# Patient Record
Sex: FEMALE | Race: WHITE | NOT HISPANIC OR LATINO | Employment: FULL TIME | URBAN - NONMETROPOLITAN AREA
[De-identification: names, ages, dates, MRNs, and addresses within clinical notes are randomized per-mention and may not be internally consistent; named-entity substitution may affect disease eponyms.]

---

## 2019-11-19 ENCOUNTER — OFFICE VISIT (OUTPATIENT)
Dept: CARDIOLOGY | Facility: CLINIC | Age: 34
End: 2019-11-19

## 2019-11-19 VITALS
SYSTOLIC BLOOD PRESSURE: 116 MMHG | WEIGHT: 197 LBS | HEART RATE: 72 BPM | DIASTOLIC BLOOD PRESSURE: 80 MMHG | OXYGEN SATURATION: 98 % | BODY MASS INDEX: 29.18 KG/M2 | HEIGHT: 69 IN

## 2019-11-19 DIAGNOSIS — R00.2 PALPITATIONS: Primary | ICD-10-CM

## 2019-11-19 DIAGNOSIS — R07.2 PRECORDIAL PAIN: ICD-10-CM

## 2019-11-19 DIAGNOSIS — R53.83 FATIGUE, UNSPECIFIED TYPE: ICD-10-CM

## 2019-11-19 PROCEDURE — 99204 OFFICE O/P NEW MOD 45 MIN: CPT | Performed by: NURSE PRACTITIONER

## 2019-11-19 PROCEDURE — 93000 ELECTROCARDIOGRAM COMPLETE: CPT | Performed by: NURSE PRACTITIONER

## 2019-11-19 NOTE — PATIENT INSTRUCTIONS
"BMI for Adults    Body mass index (BMI) is a number that is calculated from a person's weight and height. BMI may help to estimate how much of a person's weight is composed of fat. BMI can help identify those who may be at higher risk for certain medical problems.  How is BMI used with adults?  BMI is used as a screening tool to identify possible weight problems. It is used to check whether a person is obese, overweight, healthy weight, or underweight.  How is BMI calculated?  BMI measures your weight and compares it to your height. This can be done either in English ([place] or metric measurements. Note that charts are available to help you find your BMI quickly and easily without having to do these calculations yourself.  To calculate your BMI in English ([place] measurements, your health care provider will:  1. Measure your weight in pounds (lb).  2. Multiply the number of pounds by 703.  ? For example, for a person who weighs 180 lb, multiply that number by 703, which equals 126,540.  3. Measure your height in inches (in). Then multiply that number by itself to get a measurement called \"inches squared.\"  ? For example, for a person who is 70 in tall, the \"inches squared\" measurement is 70 in x 70 in, which equals 4900 inches squared.  4. Divide the total from Step 2 (number of lb x 703) by the total from Step 3 (inches squared): 126,540 ÷ 4900 = 25.8. This is your BMI.  To calculate your BMI in metric measurements, your health care provider will:  1. Measure your weight in kilograms (kg).  2. Measure your height in meters (m). Then multiply that number by itself to get a measurement called \"meters squared.\"  ? For example, for a person who is 1.75 m tall, the \"meters squared\" measurement is 1.75 m x 1.75 m, which is equal to 3.1 meters squared.  3. Divide the number of kilograms (your weight) by the meters squared number. In this example: 70 ÷ 3.1 = 22.6. This is your BMI.  How is BMI interpreted?  To interpret your " results, your health care provider will use BMI charts to identify whether you are underweight, normal weight, overweight, or obese. The following guidelines will be used:  · Underweight: BMI less than 18.5.  · Normal weight: BMI between 18.5 and 24.9.  · Overweight: BMI between 25 and 29.9.  · Obese: BMI of 30 and above.  Please note:  · Weight includes both fat and muscle, so someone with a muscular build, such as an athlete, may have a BMI that is higher than 24.9. In cases like these, BMI is not an accurate measure of body fat.  · To determine if excess body fat is the cause of a BMI of 25 or higher, further assessments may need to be done by a health care provider.  · BMI is usually interpreted in the same way for men and women.  Why is BMI a useful tool?  BMI is useful in two ways:  · Identifying a weight problem that may be related to a medical condition, or that may increase the risk for medical problems.  · Promoting lifestyle and diet changes in order to reach a healthy weight.  Summary  · Body mass index (BMI) is a number that is calculated from a person's weight and height.  · BMI may help to estimate how much of a person's weight is composed of fat. BMI can help identify those who may be at higher risk for certain medical problems.  · BMI can be measured using English measurements or metric measurements.  · To interpret your results, your health care provider will use BMI charts to identify whether you are underweight, normal weight, overweight, or obese.  This information is not intended to replace advice given to you by your health care provider. Make sure you discuss any questions you have with your health care provider.    Acute Coronary Syndrome    Acute coronary syndrome (ACS) is a serious problem in which there is suddenly not enough blood and oxygen reaching the heart. ACS can result in chest pain or a heart attack.  This condition is a medical emergency. If you have any symptoms of this  condition, get help right away.  What are the causes?  This condition may be caused by:  · Buildup of fat and cholesterol inside of the arteries (atherosclerosis). This is the most common cause. The buildup (plaque) can cause blood vessels in the heart (coronary arteries) to become narrow or blocked, which reduces blood flow to the heart. Plaque can also break off and lead to a clot, which can block an artery and cause a heart attack or stroke.  · Sudden tightening of the muscles around the coronary arteries (coronary spasm).  · Tearing of a coronary artery (spontaneous coronary artery dissection).  · Very low blood pressure (hypotension).  · An abnormal heartbeat (arrhythmia).  · Other medical conditions that cause a decrease of oxygen to the heart, such as anemiaorrespiratory failure.  · Using cocaine or methamphetamine.  What increases the risk?  The following factors may make you more likely to develop this condition:  · Age. The risk for ACS increases as you get older.  · History of chest pain, heart attack, peripheral artery disease, or stroke.  · Having taken chemotherapy or immune-suppressing medicines.  · Being male.  · Family history of chest pain, heart disease, or stroke.  · Smoking.  · Not exercising enough.  · Being overweight.  · High cholesterol.  · High blood pressure (hypertension).  · Diabetes.  · Excessive alcohol use.  What are the signs or symptoms?  Common symptoms of this condition include:  · Chest pain. The pain may last a long time, or it may stop and come back (recur). It may feel like:  ? Crushing or squeezing.  ? Tightness, pressure, fullness, or heaviness.  · Arm, neck, jaw, or back pain.  · Heartburn or indigestion.  · Shortness of breath.  · Nausea.  · Sudden cold sweats.  · Light-headedness.  · Dizziness, or passing out.  · Tiredness (fatigue).  Sometimes there are no symptoms.  How is this diagnosed?  This condition may be diagnosed based on:  · Your medical history and  symptoms.  · An electrocardiogram (ECG). This imaging test measures the heart's electrical activity.  · Blood tests. Cardiac blood tests may need to be repeated at designated time intervals.  · Chest X-ray.  · A CT scan of the chest.  · A coronary angiogram. This is a procedure in which dye is injected into the bloodstream and then X-rays are taken to show if there is a blockage in a coronary artery.  · Exercise stress testing.  · Echocardiography. This is a test that uses sound waves to produce detailed images of the heart.  How is this treated?  The treatment is to restore blood flow to the heart as soon as possible. Treatment for this condition may include:  · Oxygen therapy.  · Medicines, such as:  ? Antiplatelet medicines and blood-thinning medicines, such as aspirin. These help prevent blood clots.  ? Medicine that dissolves any blood clots (fibrinolytic therapy).  ? Blood pressure medicines.  ? Nitroglycerin. This helps relieve chest pain and widens blood vessels to improve blood flow.  ? Pain medicine.  ? Cholesterol-lowering medicine.  · Surgery, such as:  ? Coronary angioplasty with stent placement. This involves placing a small piece of metal that looks like mesh or a spring into a narrow coronary artery. This widens the artery and keep it open.  ? Coronary artery bypass surgery. This involves taking a section of a blood vessel from a different part of your body, and placing it on the blocked coronary artery to allow blood to flow around (bypass) the blockage.  · Cardiac rehabilitation. This is a program that helps improve your health and well-being. It includes exercise training, education, and counseling to help you recover.  Follow these instructions at home:  Eating and drinking  · Eat a heart-healthy diet that includes whole grains, fruits and vegetables, lean proteins, and low-fat or nonfat dairy products.  · Limit how much salt (sodium) you eat as told by your health care provider. Follow  instructions from your health care provider about any other eating or drinking restrictions, such as limiting foods that are high in fat and processed sugars.  · Use healthy cooking methods such as roasting, grilling, broiling, baking, poaching, steaming, or stir-frying.  · Talk with a dietitian to learn about healthy cooking methods and how to eat less sodium.  Medicines  · Take over-the-counter and prescription medicines only as told by your health care provider.  · Do not take these medicines unless your health care provider approves:  ? Vitamin supplements that contain vitamin A or vitamin E.  ? Nonsteroidal anti-inflammatory drugs (NSAIDs), such as ibuprofen, naproxen, or celecoxib.  ? Hormone replacement therapy that contains estrogen.  If you are taking blood thinners:  · Talk with your health care provider before you take any medicines that contain aspirin or NSAIDs. These medicines increase your risk for dangerous bleeding.  · Take your medicine exactly as told, at the same time every day.  · Avoid activities that could cause injury or bruising, and follow instructions about how to prevent falls.  · Wear a medical alert bracelet, and carry a card that lists what medicines you take.  Activity  · Join a cardiac rehabilitation program. An exercise plan will be developed for you.  · Ask your health care provider:  ? What activities and exercises are safe for you.  ? If you should follow specific instructions about lifting, driving, or climbing stairs.  Lifestyle  · Do not use any products that contain nicotine or tobacco, such as cigarettes and e-cigarettes. If you need help quitting, ask your health care provider.  · If your health care provider says that alcohol is safe for you, limit your alcohol intake to no more than 1 drink a day. One drink equals 12 oz of beer, 5 oz of wine, or 1½ oz of hard liquor.  · Maintain a healthy weight. If you need to lose weight, work with your health care provider to do so  safely.  General instructions  · Tell all the health care providers who care for you about your heart condition, including your dentist. This may affect the medicines or treatment you receive.  · Manage any other health conditions you have, such as hypertension or diabetes. These conditions affect your heart.  · Learn ways to manage stress.  · Get screened for depression, and get mental health treatment if you need it. People with ACS are at higher risk for depression.  · Keep your vaccinations up to date. Get the flu shot (influenza vaccine) every year.  · If directed, monitor your blood pressure at home.  · Keep all follow-up visits as told by your health care provider. This is important.  Contact a health care provider if:  · You feel overwhelmed or sad.  · You have trouble doing your daily activities.  Get help right away if:  · You have pain in your chest, neck, arm, jaw, stomach, or back that recurs, and:  ? It lasts for more than a few minutes.  ? It is not relieved by taking the medicineyour health care provider prescribed.  · You have unexplained:  ? Heavy sweating.  ? Heartburn or indigestion.  ? Nausea or vomiting.  ? Shortness of breath.  ? Difficulty breathing.  ? Fatigue.  ? Nervousness or anxiety.  ? Weakness.  ? Diarrhea.  ? Dark stools or blood in your stool.  · You have sudden light-headedness or dizziness.  · Your blood pressure is higher than 180/120.  · You faint.  · You have thoughts about hurting yourself.  These symptoms may represent a serious problem that is an emergency. Do not wait to see if the symptoms will go away. Get medical help right away. Call your local emergency services (751 in the [place] Do not drive yourself to the hospital.   If you ever feel like you may hurt yourself or others, or have thoughts about taking your own life, get help right away. You can go to your nearest emergency department or call:  · Emergency services (431 in the [place]  · A suicide crisis helpline, such  as the National Suicide Prevention Lifeline at 1-486.227.9010. This is open 24 hours a day.  Summary  · Acute coronary syndrome (ACS) is when there is not enough blood and oxygen being supplied to the heart. ACS can result in chest pain or a heart attack.  · Acute coronary syndrome is a medical emergency. If you have any symptoms of this condition, get help right away.  · Treatment includes medicines and procedures to open the blocked arteries and restore blood flow.  This information is not intended to replace advice given to you by your health care provider. Make sure you discuss any questions you have with your health care provider.  Document Released: 12/18/2006 Document Revised: 08/28/2018 Document Reviewed: 08/28/2018  [employer] Interactive Patient Education © 2019 [employer]    Palpitations  Palpitations are feelings that your heartbeat is not normal. Your heartbeat may feel like it is:  · Uneven.  · Faster than normal.  · Fluttering.  · Skipping a beat.  This is usually not a serious problem. In some cases, you may need tests to rule out any serious problems.  Follow these instructions at home:  Pay attention to any changes in your condition. Take these actions to help manage your symptoms:  Eating and drinking  · Avoid:  ? Coffee, tea, soft drinks, and energy drinks.  ? Chocolate.  ? Alcohol.  ? Diet pills.  Lifestyle    · Try to lower your stress. These things can help you relax:  ? Yoga.  ? Deep breathing and meditation.  ? Exercise.  ? Using words and images to create positive thoughts (guided imagery).  ? Using your mind to control things in your body (biofeedback).  · Do not use drugs.  · Get plenty of rest and sleep. Keep a regular bed time.  General instructions    · Take over-the-counter and prescription medicines only as told by your doctor.  · Do not use any products that contain nicotine or tobacco, such as cigarettes and e-cigarettes. If you need help quitting, ask your doctor.  · Keep all  follow-up visits as told by your doctor. This is important. You may need more tests if palpitations do not go away or get worse.  Contact a doctor if:  · Your symptoms last more than 24 hours.  · Your symptoms occur more often.  Get help right away if you:  · Have chest pain.  · Feel short of breath.  · Have a very bad headache.  · Feel dizzy.  · Pass out (faint).  Summary  · Palpitations are feelings that your heartbeat is uneven or faster than normal. It may feel like your heart is fluttering or skipping a beat.  · Avoid food and drinks that may cause palpitations. These include caffeine, chocolate, and alcohol.  · Try to lower your stress. Do not smoke or use drugs.  · Get help right away if you faint or have chest pain, shortness of breath, a severe headache, or dizziness.  This information is not intended to replace advice given to you by your health care provider. Make sure you discuss any questions you have with your health care provider.  Document Released: 09/26/2009 Document Revised: 01/30/2019 Document Reviewed: 01/30/2019  [employer] Interactive Patient Education © 2019 [employer]  Document Released: 08/29/2005 Document Revised: 10/31/2018 Document Reviewed: 10/31/2018  [employer] Interactive Patient Education © 2019 [employer]

## 2019-11-19 NOTE — PROGRESS NOTES
Subjective     China Carbajal is a 34 y.o. female who presents to day for Rapid Heart Rate (started end of June, reports high rate of 163) and Palpitations.    CHIEF COMPLIANT  Chief Complaint   Patient presents with   • Rapid Heart Rate     started end of June, reports high rate of 163   • Palpitations     Problem List    Palpitations  Tachycardia    Active Problems:  Problem List Items Addressed This Visit     None      Visit Diagnoses     Palpitations    -  Primary    Relevant Orders    Cardiac Event Monitor    Adult Transthoracic Echo Complete W/ Cont if Necessary Per Protocol    Stress Test With Myocardial Perfusion One Day    Precordial pain        Relevant Orders    Adult Transthoracic Echo Complete W/ Cont if Necessary Per Protocol    Stress Test With Myocardial Perfusion One Day    Fatigue, unspecified type        Relevant Orders    Adult Transthoracic Echo Complete W/ Cont if Necessary Per Protocol    Stress Test With Myocardial Perfusion One Day          HPI  HPI  Ms. Carbajal is a 34-year-old female who complains of rapid heart rate, and palpitations.  She verbalizes that she has had this since June she also complains of chest pain and did not feel very well when she went away on its own.  In association with this she had fatigue and severe weakness.  Approximately 3 weeks ago she had a very similar situation where she just felt really bad and her heart rate stayed in the 1 10-1 20 range for the entire day.  Then approximately 2 weeks ago it felt like she was punched in the chest and had significant chest pain with a heart rate of 162 she stayed in a heart rate greater than 100 for the remainder of the day.  She states that her normal heart rates between 70 and 80.  And states that even when she exercise it usually does not get be of 120 bpm.  States that she is very active and goes to gym approximately 4 to 6 days a week.  States that when her heart rates fast she does have a lot less energy with a decreased  exercise tolerance and fatigue.  Patient did deny any shortness of air, PND, orthopnea, decreased exercise tolerance at baseline, syncope, or neurological changes.  PRIOR MEDS  Current Outpatient Medications on File Prior to Visit   Medication Sig Dispense Refill   • Ascorbic Acid (VITAMIN C ADULT GUMMIES PO) Take  by mouth Daily.     • COLLAGEN PO Take  by mouth Daily.     • magnesium oxide (MAGOX) 400 (241.3 Mg) MG tablet tablet Take 400 mg by mouth Daily.     • norethindrone-ethinyl estradiol-iron (ESTROSTEP FE) 1-20/1-30/1-35 MG-MCG tablet Take  by mouth Daily.       No current facility-administered medications on file prior to visit.        ALLERGIES  Bactrim [sulfamethoxazole-trimethoprim]; Procaine; and Latex    HISTORY  Past Medical History:   Diagnosis Date   • Asthma    • Fatty liver 2018       Social History     Socioeconomic History   • Marital status:      Spouse name: Not on file   • Number of children: Not on file   • Years of education: Not on file   • Highest education level: Not on file   Tobacco Use   • Smoking status: Never Smoker   • Smokeless tobacco: Never Used   Substance and Sexual Activity   • Alcohol use: No   • Drug use: No   • Sexual activity: Defer       Family History   Problem Relation Age of Onset   • Hypertension Mother    • Cancer Father    • Supraventricular tachycardia Father        Review of Systems   Constitutional: Positive for fatigue. Negative for diaphoresis.   HENT: Negative.    Eyes: Negative.    Respiratory: Negative.  Negative for chest tightness and shortness of breath.    Cardiovascular: Positive for chest pain (when heart rate is elevated), palpitations and leg swelling (feet swelling, plantar fasciatis).   Gastrointestinal: Positive for abdominal pain (sees GI), constipation, diarrhea and nausea. Negative for vomiting.   Endocrine: Negative.    Genitourinary: Negative.    Musculoskeletal: Negative.  Negative for back pain and neck pain.  "  Allergic/Immunologic: Negative for environmental allergies.   Neurological: Negative.  Negative for dizziness, syncope and light-headedness.   Hematological: Negative.  Does not bruise/bleed easily.   Psychiatric/Behavioral: Negative for sleep disturbance (denies waking with cp or soa).       Objective     VITALS: /80 (BP Location: Left arm, Patient Position: Sitting)   Pulse 72   Ht 175.3 cm (69\")   Wt 89.4 kg (197 lb)   SpO2 98%   BMI 29.09 kg/m²     LABS:   Lab Results (most recent)     None          IMAGING:   No Images in the past 120 days found..    EXAM:  Physical Exam   Constitutional: She is oriented to person, place, and time. Vital signs are normal. She appears well-developed and well-nourished.   HENT:   Head: Normocephalic.   Eyes: EOM are normal. Pupils are equal, round, and reactive to light.   Neck: Trachea normal and phonation normal. Neck supple. No JVD present. Carotid bruit is not present. No thyroid mass present.   Cardiovascular: Normal rate, regular rhythm, normal heart sounds and intact distal pulses. Exam reveals no gallop and no friction rub.   No murmur heard.  Pulses:       Radial pulses are 2+ on the right side, and 2+ on the left side.        Posterior tibial pulses are 2+ on the right side, and 2+ on the left side.   Pulmonary/Chest: Effort normal and breath sounds normal. No respiratory distress. She has no wheezes. She has no rales.   Abdominal: Soft. Bowel sounds are normal. She exhibits no distension and no abdominal bruit. There is no tenderness.   Musculoskeletal: Normal range of motion.   Neurological: She is alert and oriented to person, place, and time. She has normal strength. No cranial nerve deficit or sensory deficit.   Skin: Skin is warm, dry and intact. Capillary refill takes less than 2 seconds. No rash noted.   Psychiatric: She has a normal mood and affect. Her speech is normal and behavior is normal. Judgment and thought content normal. Cognition and " memory are normal.   Nursing note and vitals reviewed.      Procedure     ECG 12 Lead  Date/Time: 11/19/2019 10:40 AM  Performed by: Weston Hercules APRN  Authorized by: Weston Herclues APRN   Comparison: not compared with previous ECG   Previous ECG: no previous ECG available  Rhythm: sinus rhythm  Rate: normal  BPM: 65  QRS axis: normal    Clinical impression: normal ECG               Assessment/Plan    Diagnosis Plan   1. Palpitations  Cardiac Event Monitor    Adult Transthoracic Echo Complete W/ Cont if Necessary Per Protocol    Stress Test With Myocardial Perfusion One Day   2. Precordial pain  Adult Transthoracic Echo Complete W/ Cont if Necessary Per Protocol    Stress Test With Myocardial Perfusion One Day   3. Fatigue, unspecified type  Adult Transthoracic Echo Complete W/ Cont if Necessary Per Protocol    Stress Test With Myocardial Perfusion One Day   1.  Is having significant tachycardia and palpitations as reported.  Patient seen in normal sinus rhythm today at 65.  However patient states that her rates are getting up to 140 and at one time even got up greater than 160.  Will order an event monitor to further evaluate her tachycardia/palpitations.  Verbalized that she recently had lab work done at Dr. Osman Parra's and that he verbalized everything was normal.  We will try to get a copy of those labs prior to repeating the blood work.  2.  Is having chest pain and is unable to be determined whether she is to have the chest pain or palpitations first.  Due to atypical chest pain I think it reasonable to further evaluate for an ischemic cause.  We will perform echocardiogram and stress test.  3.  Informed of signs of symptoms ACS and advised to seek emergent treatment for any new or worsening symptoms.  Patient also advised to seek sooner follow-up as needed.    4.  Beta-blocker therapy with patient and patient wishes to hold off at this time.    Return in about 3 months (around 2/19/2020), or if  symptoms worsen or fail to improve.    [place] was seen today for rapid heart rate and palpitations.    Diagnoses and all orders for this visit:    Palpitations  -     Cardiac Event Monitor; Future  -     Adult Transthoracic Echo Complete W/ Cont if Necessary Per Protocol; Future  -     Stress Test With Myocardial Perfusion One Day; Future    Precordial pain  -     Adult Transthoracic Echo Complete W/ Cont if Necessary Per Protocol; Future  -     Stress Test With Myocardial Perfusion One Day; Future    Fatigue, unspecified type  -     Adult Transthoracic Echo Complete W/ Cont if Necessary Per Protocol; Future  -     Stress Test With Myocardial Perfusion One Day; Future    Other orders  -     ECG 12 Lead              Patient's Body mass index is 29.09 kg/m². BMI is above normal parameters. Recommendations include: educational material.           MEDS ORDERED DURING VISIT:  No orders of the defined types were placed in this encounter.          This document has been electronically signed by Weston Hercules Jr., OMKAR  November 20, 2019 1:42 AM

## 2019-12-12 ENCOUNTER — APPOINTMENT (OUTPATIENT)
Dept: CARDIOLOGY | Facility: HOSPITAL | Age: 34
End: 2019-12-12

## 2020-01-16 ENCOUNTER — HOSPITAL ENCOUNTER (OUTPATIENT)
Dept: CARDIOLOGY | Facility: HOSPITAL | Age: 35
Discharge: HOME | End: 2020-01-16

## 2020-01-16 DIAGNOSIS — R53.83 FATIGUE, UNSPECIFIED TYPE: ICD-10-CM

## 2020-01-16 DIAGNOSIS — R07.2 PRECORDIAL PAIN: ICD-10-CM

## 2020-01-16 DIAGNOSIS — R00.2 PALPITATIONS: ICD-10-CM

## 2020-01-16 PROCEDURE — 0 TECHNETIUM SESTAMIBI: Performed by: INTERNAL MEDICINE

## 2020-01-16 PROCEDURE — 93306 TTE W/DOPPLER COMPLETE: CPT | Performed by: INTERNAL MEDICINE

## 2020-01-16 PROCEDURE — A9500 TC99M SESTAMIBI: HCPCS | Performed by: INTERNAL MEDICINE

## 2020-01-16 PROCEDURE — 78452 HT MUSCLE IMAGE SPECT MULT: CPT

## 2020-01-16 PROCEDURE — 93017 CV STRESS TEST TRACING ONLY: CPT

## 2020-01-16 PROCEDURE — 78452 HT MUSCLE IMAGE SPECT MULT: CPT | Performed by: INTERNAL MEDICINE

## 2020-01-16 PROCEDURE — 93018 CV STRESS TEST I&R ONLY: CPT | Performed by: INTERNAL MEDICINE

## 2020-01-16 PROCEDURE — 93306 TTE W/DOPPLER COMPLETE: CPT

## 2020-01-16 PROCEDURE — 93016 CV STRESS TEST SUPVJ ONLY: CPT | Performed by: NURSE PRACTITIONER

## 2020-01-16 RX ADMIN — TECHNETIUM TC 99M SESTAMIBI 1 DOSE: 1 INJECTION INTRAVENOUS at 10:15

## 2020-01-16 RX ADMIN — TECHNETIUM TC 99M SESTAMIBI 1 DOSE: 1 INJECTION INTRAVENOUS at 08:43

## 2020-01-17 LAB
BH CV NUCLEAR PRIOR STUDY: 3
BH CV STRESS BP STAGE 1: NORMAL
BH CV STRESS BP STAGE 2: NORMAL
BH CV STRESS BP STAGE 3: NORMAL
BH CV STRESS DURATION MIN STAGE 1: 3
BH CV STRESS DURATION MIN STAGE 2: 3
BH CV STRESS DURATION MIN STAGE 3: 3
BH CV STRESS DURATION SEC STAGE 1: 0
BH CV STRESS DURATION SEC STAGE 2: 0
BH CV STRESS DURATION SEC STAGE 3: 0
BH CV STRESS GRADE STAGE 1: 10
BH CV STRESS GRADE STAGE 2: 12
BH CV STRESS GRADE STAGE 3: 14
BH CV STRESS HR STAGE 1: 118
BH CV STRESS HR STAGE 2: 140
BH CV STRESS HR STAGE 3: 164
BH CV STRESS METS STAGE 1: 5
BH CV STRESS METS STAGE 2: 7.5
BH CV STRESS METS STAGE 3: 10
BH CV STRESS PROTOCOL 1: NORMAL
BH CV STRESS RECOVERY BP: NORMAL MMHG
BH CV STRESS RECOVERY HR: 99 BPM
BH CV STRESS SPEED STAGE 1: 1.7
BH CV STRESS SPEED STAGE 2: 2.5
BH CV STRESS SPEED STAGE 3: 3.4
BH CV STRESS STAGE 1: 1
BH CV STRESS STAGE 2: 2
BH CV STRESS STAGE 3: 3
MAXIMAL PREDICTED HEART RATE: 185 BPM
PERCENT MAX PREDICTED HR: 88.65 %
STRESS BASELINE BP: NORMAL MMHG
STRESS BASELINE HR: 79 BPM
STRESS PERCENT HR: 104 %
STRESS POST ESTIMATED WORKLOAD: 10.1 METS
STRESS POST EXERCISE DUR MIN: 7 MIN
STRESS POST EXERCISE DUR SEC: 28 SEC
STRESS POST PEAK BP: NORMAL MMHG
STRESS POST PEAK HR: 164 BPM
STRESS TARGET HR: 157 BPM

## 2020-01-20 ENCOUNTER — DOCUMENTATION (OUTPATIENT)
Dept: CARDIOLOGY | Facility: CLINIC | Age: 35
End: 2020-01-20

## 2020-01-20 NOTE — PROGRESS NOTES
PATIENT HAD L/M NEEDING STRESS TEST RESULTS. AWARE NEG. FOR ISCHEMIA AND NEEDS TO KEEP F/U APPT. IN FEB. SINDI MONET

## 2020-01-21 ENCOUNTER — TELEPHONE (OUTPATIENT)
Dept: CARDIOLOGY | Facility: CLINIC | Age: 35
End: 2020-01-21

## 2020-01-21 NOTE — TELEPHONE ENCOUNTER
Patient notified of echo results per MARILEE ESPITIA, patient verbalized understanding. Doris Cardozo LPN        ----- Message from OMKAR Dunlap sent at 1/20/2020 10:25 PM EST -----  Regarding: FW:  Normal stress keep follow up  ----- Message -----  From: Kirk Park MD  Sent: 1/17/2020   4:44 PM EST  To: OMKAR Dunlap

## 2020-01-27 LAB
BH CV ECHO MEAS - ACS: 1.8 CM
BH CV ECHO MEAS - AO MAX PG: 11.3 MMHG
BH CV ECHO MEAS - AO MEAN PG: 5 MMHG
BH CV ECHO MEAS - AO ROOT AREA (BSA CORRECTED): 1.3
BH CV ECHO MEAS - AO ROOT AREA: 5.3 CM^2
BH CV ECHO MEAS - AO ROOT DIAM: 2.6 CM
BH CV ECHO MEAS - AO V2 MAX: 168 CM/SEC
BH CV ECHO MEAS - AO V2 MEAN: 105 CM/SEC
BH CV ECHO MEAS - AO V2 VTI: 33.6 CM
BH CV ECHO MEAS - BSA(HAYCOCK): 2.1 M^2
BH CV ECHO MEAS - BSA: 2.1 M^2
BH CV ECHO MEAS - BZI_BMI: 29.1 KILOGRAMS/M^2
BH CV ECHO MEAS - BZI_METRIC_HEIGHT: 175.3 CM
BH CV ECHO MEAS - BZI_METRIC_WEIGHT: 89.4 KG
BH CV ECHO MEAS - EDV(CUBED): 56.2 ML
BH CV ECHO MEAS - EDV(MOD-SP4): 83.9 ML
BH CV ECHO MEAS - EDV(TEICH): 63.1 ML
BH CV ECHO MEAS - EF(CUBED): 80.3 %
BH CV ECHO MEAS - EF(MOD-SP4): 56.6 %
BH CV ECHO MEAS - EF(TEICH): 73.4 %
BH CV ECHO MEAS - ESV(CUBED): 11.1 ML
BH CV ECHO MEAS - ESV(MOD-SP4): 36.4 ML
BH CV ECHO MEAS - ESV(TEICH): 16.8 ML
BH CV ECHO MEAS - FS: 41.8 %
BH CV ECHO MEAS - IVS/LVPW: 1.1
BH CV ECHO MEAS - IVSD: 1.1 CM
BH CV ECHO MEAS - LA DIMENSION: 3.8 CM
BH CV ECHO MEAS - LA/AO: 1.5
BH CV ECHO MEAS - LV DIASTOLIC VOL/BSA (35-75): 40.9 ML/M^2
BH CV ECHO MEAS - LV IVRT: 0.1 SEC
BH CV ECHO MEAS - LV MASS(C)D: 121.5 GRAMS
BH CV ECHO MEAS - LV MASS(C)DI: 59.2 GRAMS/M^2
BH CV ECHO MEAS - LV SYSTOLIC VOL/BSA (12-30): 17.7 ML/M^2
BH CV ECHO MEAS - LVIDD: 3.8 CM
BH CV ECHO MEAS - LVIDS: 2.2 CM
BH CV ECHO MEAS - LVLD AP4: 7.3 CM
BH CV ECHO MEAS - LVLS AP4: 6.4 CM
BH CV ECHO MEAS - LVOT AREA (M): 2.5 CM^2
BH CV ECHO MEAS - LVOT AREA: 2.5 CM^2
BH CV ECHO MEAS - LVOT DIAM: 1.8 CM
BH CV ECHO MEAS - LVPWD: 0.97 CM
BH CV ECHO MEAS - MV A MAX VEL: 68.9 CM/SEC
BH CV ECHO MEAS - MV DEC SLOPE: 436 CM/SEC^2
BH CV ECHO MEAS - MV E MAX VEL: 82.9 CM/SEC
BH CV ECHO MEAS - MV E/A: 1.2
BH CV ECHO MEAS - RVDD: 3.3 CM
BH CV ECHO MEAS - SI(AO): 86.9 ML/M^2
BH CV ECHO MEAS - SI(CUBED): 22 ML/M^2
BH CV ECHO MEAS - SI(MOD-SP4): 23.1 ML/M^2
BH CV ECHO MEAS - SI(TEICH): 22.6 ML/M^2
BH CV ECHO MEAS - SV(AO): 178.4 ML
BH CV ECHO MEAS - SV(CUBED): 45.1 ML
BH CV ECHO MEAS - SV(MOD-SP4): 47.5 ML
BH CV ECHO MEAS - SV(TEICH): 46.4 ML
MAXIMAL PREDICTED HEART RATE: 185 BPM
STRESS TARGET HR: 157 BPM

## 2020-02-20 ENCOUNTER — OFFICE VISIT (OUTPATIENT)
Dept: CARDIOLOGY | Facility: CLINIC | Age: 35
End: 2020-02-20

## 2020-02-20 VITALS
HEIGHT: 69 IN | BODY MASS INDEX: 30.36 KG/M2 | OXYGEN SATURATION: 99 % | HEART RATE: 88 BPM | DIASTOLIC BLOOD PRESSURE: 87 MMHG | WEIGHT: 205 LBS | SYSTOLIC BLOOD PRESSURE: 142 MMHG

## 2020-02-20 DIAGNOSIS — I10 ESSENTIAL HYPERTENSION: Primary | ICD-10-CM

## 2020-02-20 DIAGNOSIS — R60.0 BILATERAL LEG EDEMA: ICD-10-CM

## 2020-02-20 DIAGNOSIS — R00.2 PALPITATIONS: ICD-10-CM

## 2020-02-20 PROCEDURE — 99214 OFFICE O/P EST MOD 30 MIN: CPT | Performed by: NURSE PRACTITIONER

## 2020-02-20 RX ORDER — LOSARTAN POTASSIUM 25 MG/1
12.5 TABLET ORAL DAILY
Qty: 15 TABLET | Refills: 11 | Status: SHIPPED | OUTPATIENT
Start: 2020-02-20 | End: 2021-01-21

## 2020-02-20 NOTE — PATIENT INSTRUCTIONS
"BMI for Adults    Body mass index (BMI) is a number that is calculated from a person's weight and height. BMI may help to estimate how much of a person's weight is composed of fat. BMI can help identify those who may be at higher risk for certain medical problems.  How is BMI used with adults?  BMI is used as a screening tool to identify possible weight problems. It is used to check whether a person is obese, overweight, healthy weight, or underweight.  How is BMI calculated?  BMI measures your weight and compares it to your height. This can be done either in English ([place] or metric measurements. Note that charts are available to help you find your BMI quickly and easily without having to do these calculations yourself.  To calculate your BMI in English ([place] measurements, your health care provider will:  1. Measure your weight in pounds (lb).  2. Multiply the number of pounds by 703.  ? For example, for a person who weighs 180 lb, multiply that number by 703, which equals 126,540.  3. Measure your height in inches (in). Then multiply that number by itself to get a measurement called \"inches squared.\"  ? For example, for a person who is 70 in tall, the \"inches squared\" measurement is 70 in x 70 in, which equals 4900 inches squared.  4. Divide the total from Step 2 (number of lb x 703) by the total from Step 3 (inches squared): 126,540 ÷ 4900 = 25.8. This is your BMI.  To calculate your BMI in metric measurements, your health care provider will:  1. Measure your weight in kilograms (kg).  2. Measure your height in meters (m). Then multiply that number by itself to get a measurement called \"meters squared.\"  ? For example, for a person who is 1.75 m tall, the \"meters squared\" measurement is 1.75 m x 1.75 m, which is equal to 3.1 meters squared.  3. Divide the number of kilograms (your weight) by the meters squared number. In this example: 70 ÷ 3.1 = 22.6. This is your BMI.  How is BMI interpreted?  To interpret your " results, your health care provider will use BMI charts to identify whether you are underweight, normal weight, overweight, or obese. The following guidelines will be used:  · Underweight: BMI less than 18.5.  · Normal weight: BMI between 18.5 and 24.9.  · Overweight: BMI between 25 and 29.9.  · Obese: BMI of 30 and above.  Please note:  · Weight includes both fat and muscle, so someone with a muscular build, such as an athlete, may have a BMI that is higher than 24.9. In cases like these, BMI is not an accurate measure of body fat.  · To determine if excess body fat is the cause of a BMI of 25 or higher, further assessments may need to be done by a health care provider.  · BMI is usually interpreted in the same way for men and women.  Why is BMI a useful tool?  BMI is useful in two ways:  · Identifying a weight problem that may be related to a medical condition, or that may increase the risk for medical problems.  · Promoting lifestyle and diet changes in order to reach a healthy weight.  Summary  · Body mass index (BMI) is a number that is calculated from a person's weight and height.  · BMI may help to estimate how much of a person's weight is composed of fat. BMI can help identify those who may be at higher risk for certain medical problems.  · BMI can be measured using English measurements or metric measurements.  · To interpret your results, your health care provider will use BMI charts to identify whether you are underweight, normal weight, overweight, or obese.  This information is not intended to replace advice given to you by your health care provider. Make sure you discuss any questions you have with your health care provider.  Document Released: 08/29/2005 Document Revised: 10/31/2018 Document Reviewed: 10/31/2018  [employer] Interactive Patient Education © 2020 [employer]    Acute Coronary Syndrome    Acute coronary syndrome (ACS) is a serious problem in which there is suddenly not enough blood and  oxygen reaching the heart. ACS can result in chest pain or a heart attack.  This condition is a medical emergency. If you have any symptoms of this condition, get help right away.  What are the causes?  This condition may be caused by:  · Buildup of fat and cholesterol inside of the arteries (atherosclerosis). This is the most common cause. The buildup (plaque) can cause blood vessels in the heart (coronary arteries) to become narrow or blocked, which reduces blood flow to the heart. Plaque can also break off and lead to a clot, which can block an artery and cause a heart attack or stroke.  · Sudden tightening of the muscles around the coronary arteries (coronary spasm).  · Tearing of a coronary artery (spontaneous coronary artery dissection).  · Very low blood pressure (hypotension).  · An abnormal heartbeat (arrhythmia).  · Other medical conditions that cause a decrease of oxygen to the heart, such as anemiaorrespiratory failure.  · Using cocaine or methamphetamine.  What increases the risk?  The following factors may make you more likely to develop this condition:  · Age. The risk for ACS increases as you get older.  · History of chest pain, heart attack, peripheral artery disease, or stroke.  · Having taken chemotherapy or immune-suppressing medicines.  · Being male.  · Family history of chest pain, heart disease, or stroke.  · Smoking.  · Not exercising enough.  · Being overweight.  · High cholesterol.  · High blood pressure (hypertension).  · Diabetes.  · Excessive alcohol use.  What are the signs or symptoms?  Common symptoms of this condition include:  · Chest pain. The pain may last a long time, or it may stop and come back (recur). It may feel like:  ? Crushing or squeezing.  ? Tightness, pressure, fullness, or heaviness.  · Arm, neck, jaw, or back pain.  · Heartburn or indigestion.  · Shortness of breath.  · Nausea.  · Sudden cold sweats.  · Light-headedness.  · Dizziness, or passing out.  · Tiredness  (fatigue).  Sometimes there are no symptoms.  How is this diagnosed?  This condition may be diagnosed based on:  · Your medical history and symptoms.  · An electrocardiogram (ECG). This imaging test measures the heart's electrical activity.  · Blood tests. Cardiac blood tests may need to be repeated at designated time intervals.  · Chest X-ray.  · A CT scan of the chest.  · A coronary angiogram. This is a procedure in which dye is injected into the bloodstream and then X-rays are taken to show if there is a blockage in a coronary artery.  · Exercise stress testing.  · Echocardiography. This is a test that uses sound waves to produce detailed images of the heart.  How is this treated?  The treatment is to restore blood flow to the heart as soon as possible. Treatment for this condition may include:  · Oxygen therapy.  · Medicines, such as:  ? Antiplatelet medicines and blood-thinning medicines, such as aspirin. These help prevent blood clots.  ? Medicine that dissolves any blood clots (fibrinolytic therapy).  ? Blood pressure medicines.  ? Nitroglycerin. This helps relieve chest pain and widens blood vessels to improve blood flow.  ? Pain medicine.  ? Cholesterol-lowering medicine.  · Surgery, such as:  ? Coronary angioplasty with stent placement. This involves placing a small piece of metal that looks like mesh or a spring into a narrow coronary artery. This widens the artery and keep it open.  ? Coronary artery bypass surgery. This involves taking a section of a blood vessel from a different part of your body, and placing it on the blocked coronary artery to allow blood to flow around (bypass) the blockage.  · Cardiac rehabilitation. This is a program that helps improve your health and well-being. It includes exercise training, education, and counseling to help you recover.  Follow these instructions at home:  Eating and drinking  · Eat a heart-healthy diet that includes whole grains, fruits and vegetables, lean  proteins, and low-fat or nonfat dairy products.  · Limit how much salt (sodium) you eat as told by your health care provider. Follow instructions from your health care provider about any other eating or drinking restrictions, such as limiting foods that are high in fat and processed sugars.  · Use healthy cooking methods such as roasting, grilling, broiling, baking, poaching, steaming, or stir-frying.  · Talk with a dietitian to learn about healthy cooking methods and how to eat less sodium.  Medicines  · Take over-the-counter and prescription medicines only as told by your health care provider.  · Do not take these medicines unless your health care provider approves:  ? Vitamin supplements that contain vitamin A or vitamin E.  ? Nonsteroidal anti-inflammatory drugs (NSAIDs), such as ibuprofen, naproxen, or celecoxib.  ? Hormone replacement therapy that contains estrogen.  If you are taking blood thinners:  · Talk with your health care provider before you take any medicines that contain aspirin or NSAIDs. These medicines increase your risk for dangerous bleeding.  · Take your medicine exactly as told, at the same time every day.  · Avoid activities that could cause injury or bruising, and follow instructions about how to prevent falls.  · Wear a medical alert bracelet, and carry a card that lists what medicines you take.  Activity  · Join a cardiac rehabilitation program. An exercise plan will be developed for you.  · Ask your health care provider:  ? What activities and exercises are safe for you.  ? If you should follow specific instructions about lifting, driving, or climbing stairs.  Lifestyle  · Do not use any products that contain nicotine or tobacco, such as cigarettes and e-cigarettes. If you need help quitting, ask your health care provider.  · If your health care provider says that alcohol is safe for you, limit your alcohol intake to no more than 1 drink a day. One drink equals 12 oz of beer, 5 oz of  wine, or 1½ oz of hard liquor.  · Maintain a healthy weight. If you need to lose weight, work with your health care provider to do so safely.  General instructions  · Tell all the health care providers who care for you about your heart condition, including your dentist. This may affect the medicines or treatment you receive.  · Manage any other health conditions you have, such as hypertension or diabetes. These conditions affect your heart.  · Learn ways to manage stress.  · Get screened for depression, and get mental health treatment if you need it. People with ACS are at higher risk for depression.  · Keep your vaccinations up to date. Get the flu shot (influenza vaccine) every year.  · If directed, monitor your blood pressure at home.  · Keep all follow-up visits as told by your health care provider. This is important.  Contact a health care provider if:  · You feel overwhelmed or sad.  · You have trouble doing your daily activities.  Get help right away if:  · You have pain in your chest, neck, arm, jaw, stomach, or back that recurs, and:  ? It lasts for more than a few minutes.  ? It is not relieved by taking the medicineyour health care provider prescribed.  · You have unexplained:  ? Heavy sweating.  ? Heartburn or indigestion.  ? Nausea or vomiting.  ? Shortness of breath.  ? Difficulty breathing.  ? Fatigue.  ? Nervousness or anxiety.  ? Weakness.  ? Diarrhea.  ? Dark stools or blood in your stool.  · You have sudden light-headedness or dizziness.  · Your blood pressure is higher than 180/120.  · You faint.  · You have thoughts about hurting yourself.  These symptoms may represent a serious problem that is an emergency. Do not wait to see if the symptoms will go away. Get medical help right away. Call your local emergency services (911 in the [place] Do not drive yourself to the hospital.   If you ever feel like you may hurt yourself or others, or have thoughts about taking your own life, get help right  away. You can go to your nearest emergency department or call:  · Emergency services (911 in the [place]  · A suicide crisis helpline, such as the National Suicide Prevention Lifeline at 1-204.166.5896. This is open 24 hours a day.  Summary  · Acute coronary syndrome (ACS) is when there is not enough blood and oxygen being supplied to the heart. ACS can result in chest pain or a heart attack.  · Acute coronary syndrome is a medical emergency. If you have any symptoms of this condition, get help right away.  · Treatment includes medicines and procedures to open the blocked arteries and restore blood flow.  This information is not intended to replace advice given to you by your health care provider. Make sure you discuss any questions you have with your health care provider.  Document Released: 12/18/2006 Document Revised: 08/28/2018 Document Reviewed: 08/28/2018  [employer] Interactive Patient Education © 2019 [employer]

## 2020-02-20 NOTE — PROGRESS NOTES
Subjective     China Carbajal is a 34 y.o. female who presents to day for Palpitations (January 2020: Stress test, Echo and monitor) and Hypertension.    CHIEF COMPLIANT  Chief Complaint   Patient presents with   • Palpitations     January 2020: Stress test, Echo and monitor   • Hypertension       Active Problems:  Problem List Items Addressed This Visit        Cardiovascular and Mediastinum    Palpitations    Essential hypertension - Primary    Relevant Medications    losartan (COZAAR) 25 MG tablet       Other    Bilateral leg edema          HPI  HPI  Ms. Paredes is a 34-year-old female who is being followed up today for palpitations in which underwent stress test, echocardiogram and event monitor and January of this year.  Patient stress test was negative for ischemia with a post stress ejection fraction of 70% however the 3 times daily was elevated at 1.39.  Echocardiogram was a normal study with that ejection fraction of 56 to 60% with trace AI and TR.  Patient states that she is been doing relatively well she states that she has had palpitations a few times but not very often.  These are more associated with stress.  States that she also has lower extremity edema that is been persistent but does not occur every day.  Mainly when she stands or walks.  This occurs mainly after working.  She does have a history of plantar fasciitis as well.  Patient reports constipation to diarrhea with in which she sees GI for and she has had some vomiting x3.  She is already been treated for H. pylori.  She also reports waking up with lip swelling and rash for unknown reason.  Patient symptoms do the event monitor was reported during sinus tachycardia.  Denies any chest pain, shortness of breath, PND, orthopnea, fatigue, decreased exercise tolerance, syncope, neurological changes.  PRIOR MEDS  Current Outpatient Medications on File Prior to Visit   Medication Sig Dispense Refill   • Ascorbic Acid (VITAMIN C ADULT GUMMIES PO) Take  by  mouth Daily.     • COLLAGEN PO Take  by mouth Daily.     • magnesium oxide (MAGOX) 400 (241.3 Mg) MG tablet tablet Take 400 mg by mouth Daily.     • norethindrone-ethinyl estradiol-iron (ESTROSTEP FE) 1-20/1-30/1-35 MG-MCG tablet Take  by mouth Daily.       No current facility-administered medications on file prior to visit.        ALLERGIES  Procaine; Bactrim [sulfamethoxazole-trimethoprim]; and Latex    HISTORY  Past Medical History:   Diagnosis Date   • Asthma    • Fatty liver 2018       Social History     Socioeconomic History   • Marital status:      Spouse name: Not on file   • Number of children: Not on file   • Years of education: Not on file   • Highest education level: Not on file   Tobacco Use   • Smoking status: Never Smoker   • Smokeless tobacco: Never Used   Substance and Sexual Activity   • Alcohol use: No   • Drug use: No   • Sexual activity: Defer       Family History   Problem Relation Age of Onset   • Hypertension Mother    • Cancer Father    • Supraventricular tachycardia Father        Review of Systems   Constitutional: Negative for chills, fatigue and fever.   HENT: Negative.  Negative for congestion.    Eyes: Positive for visual disturbance (contacts).   Respiratory: Negative.  Negative for chest tightness and shortness of breath.    Cardiovascular: Positive for palpitations (a few times since last visit) and leg swelling. Negative for chest pain.   Gastrointestinal: Positive for abdominal pain, constipation, diarrhea and vomiting. Negative for blood in stool and nausea.   Endocrine: Negative.  Negative for cold intolerance and heat intolerance.   Genitourinary: Negative.  Negative for dysuria, frequency, hematuria and urgency.   Musculoskeletal: Negative.  Negative for back pain and neck pain.   Skin: Negative.  Negative for rash and wound.   Allergic/Immunologic: Negative.  Negative for environmental allergies and food allergies.   Neurological: Negative.  Negative for dizziness,  "syncope and light-headedness.   Hematological: Negative.  Does not bruise/bleed easily.   Psychiatric/Behavioral: Negative for sleep disturbance (denies problems sleeping, denies soa or cp).       Objective     VITALS: /87 (BP Location: Left arm, Patient Position: Sitting)   Pulse 88   Ht 175.3 cm (69\")   Wt 93 kg (205 lb)   SpO2 99%   BMI 30.27 kg/m²     LABS:   Lab Results (most recent)     None          IMAGING:   No Images in the past 120 days found..    EXAM:  Physical Exam   Constitutional: She is oriented to person, place, and time. She appears well-developed and well-nourished.   HENT:   Head: Normocephalic and atraumatic.   Eyes: Pupils are equal, round, and reactive to light. EOM are normal.   Neck: Trachea normal and phonation normal. Neck supple. No JVD present. Carotid bruit is not present. No thyroid mass present.   Cardiovascular: Normal rate, regular rhythm, normal heart sounds and intact distal pulses. Exam reveals no gallop and no friction rub.   No murmur heard.  Pulses:       Radial pulses are 2+ on the right side, and 2+ on the left side.        Posterior tibial pulses are 2+ on the right side, and 2+ on the left side.   Pulmonary/Chest: Effort normal and breath sounds normal. No respiratory distress. She has no wheezes. She has no rales.   Abdominal: Soft. Bowel sounds are normal. She exhibits no distension and no abdominal bruit. There is no tenderness.   Musculoskeletal: Normal range of motion.   Neurological: She is alert and oriented to person, place, and time. She has normal strength. No cranial nerve deficit or sensory deficit.   Skin: Skin is warm, dry and intact. Capillary refill takes less than 2 seconds. No rash noted.   Psychiatric: She has a normal mood and affect. Her speech is normal and behavior is normal. Judgment and thought content normal. Cognition and memory are normal.   Nursing note and vitals reviewed.      Procedure   Procedures       Assessment/Plan    " Diagnosis Plan   1. Essential hypertension  losartan (COZAAR) 25 MG tablet   2. Palpitations     3. Bilateral leg edema     1.  Patient's blood pressure slightly elevated at 142/87 today.  Heart rate was 90s.  Due to patient's hypertension we will start patient on losartan 12.5 mg daily.  Patient advised to monitor blood pressure for 2 weeks and report any significant highs or lows.  Also to decrease salt in her diet.  2.  Patient's palpitations have decreased significantly.  No evidence on the monitor of any dysrhythmia or sustained ectopy.  Her symptoms mainly occurred during sinus tachycardia.  No further interventions are needed at this time.  3.  Patient reports bilateral lower extremity edema.  Discussed diuretic therapy and will not proceed at this time.  4.  Informed of signs and symptoms of ACS and advised to seek emergent treatment for any new worsening symptoms.  Patient also advised sooner follow-up as needed.  Also advised to follow-up with family doctor as needed    Return in about 6 months (around 8/20/2020), or if symptoms worsen or fail to improve.    China was seen today for palpitations and hypertension.    Diagnoses and all orders for this visit:    Essential hypertension  -     losartan (COZAAR) 25 MG tablet; Take 0.5 tablets by mouth Daily.    Palpitations    Bilateral leg edema        China Carbajal  reports that she has never smoked. She has never used smokeless tobacco..           Patient's Body mass index is 30.27 kg/m². BMI is above normal parameters. Recommendations include: educational material.           MEDS ORDERED DURING VISIT:  New Medications Ordered This Visit   Medications   • losartan (COZAAR) 25 MG tablet     Sig: Take 0.5 tablets by mouth Daily.     Dispense:  15 tablet     Refill:  11           This document has been electronically signed by Weston Hercules Jr., OMKAR  February 23, 2020 11:07 PM

## 2020-02-23 PROBLEM — R00.2 PALPITATIONS: Status: ACTIVE | Noted: 2020-02-23

## 2020-02-23 PROBLEM — I10 ESSENTIAL HYPERTENSION: Status: ACTIVE | Noted: 2020-02-23

## 2020-02-23 PROBLEM — R60.0 BILATERAL LEG EDEMA: Status: ACTIVE | Noted: 2020-02-23

## 2020-08-20 ENCOUNTER — OFFICE VISIT (OUTPATIENT)
Dept: CARDIOLOGY | Facility: CLINIC | Age: 35
End: 2020-08-20

## 2020-08-20 VITALS
WEIGHT: 206 LBS | TEMPERATURE: 97.1 F | DIASTOLIC BLOOD PRESSURE: 86 MMHG | SYSTOLIC BLOOD PRESSURE: 129 MMHG | OXYGEN SATURATION: 99 % | BODY MASS INDEX: 30.51 KG/M2 | HEIGHT: 69 IN | HEART RATE: 78 BPM

## 2020-08-20 DIAGNOSIS — R00.2 PALPITATIONS: ICD-10-CM

## 2020-08-20 DIAGNOSIS — Z3A.09 9 WEEKS GESTATION OF PREGNANCY: ICD-10-CM

## 2020-08-20 DIAGNOSIS — I10 ESSENTIAL HYPERTENSION: Primary | ICD-10-CM

## 2020-08-20 PROCEDURE — 99213 OFFICE O/P EST LOW 20 MIN: CPT | Performed by: NURSE PRACTITIONER

## 2020-08-20 RX ORDER — LABETALOL 100 MG/1
100 TABLET, FILM COATED ORAL 2 TIMES DAILY
Qty: 60 TABLET | Refills: 9 | Status: ON HOLD | OUTPATIENT
Start: 2020-08-20 | End: 2021-03-21 | Stop reason: SDUPTHER

## 2020-08-20 NOTE — PROGRESS NOTES
Subjective     China Carbajal is a 34 y.o. female who presents to day for Palpitations (reports has improved since last visit) and Hypertension (Has not taken Losartan x one week. Preganant x 9 wks).    CHIEF COMPLIANT  Chief Complaint   Patient presents with   • Palpitations     reports has improved since last visit   • Hypertension     Has not taken Losartan x one week. Preganant x 9 wks       Active Problems:  Problem List Items Addressed This Visit        Cardiovascular and Mediastinum    Palpitations    Relevant Medications    labetalol (NORMODYNE) 100 MG tablet    Essential hypertension - Primary    Relevant Medications    labetalol (NORMODYNE) 100 MG tablet      Other Visit Diagnoses     9 weeks gestation of pregnancy              HPI  HPI  Ms. Carbajal is a 34-year-old female patient who is being followed up today for palpitations and hypertension.  Patient does have chronic arterial hypertension in which seems to be well controlled on her current blood pressure medication.  However she has had to stop the losartan in which she has been off of for the past week due to pregnancy.  Patient reports that she is 9 weeks pregnant.  She has no associated symptoms and her blood pressures been in the 120s per her report however with her previous pregnancy she did have help syndrome.  Due to no palpitations and control blood pressure at this time will provide patient with a prescription of labetalol for her to start only if her blood pressure gets 140/90 or greater.  Or she has palpitations.  Patient does verbalize she is doing well from the cardiovascular standpoint denies any angina or anginal equivalent symptoms.  She denies chest pain, shortness of breath, palpitations, fatigue, PND, orthopnea, syncope, or other neurological changes.  PRIOR MEDS  Current Outpatient Medications on File Prior to Visit   Medication Sig Dispense Refill   • Ascorbic Acid (VITAMIN C ADULT GUMMIES PO) Take  by mouth Daily.     • magnesium oxide  (MAGOX) 400 (241.3 Mg) MG tablet tablet Take 400 mg by mouth Daily.     • PRENATAL GUMMY VITAMIN Chew 1 each Daily.     • losartan (COZAAR) 25 MG tablet Take 0.5 tablets by mouth Daily. 15 tablet 11   • [DISCONTINUED] COLLAGEN PO Take  by mouth Daily.       No current facility-administered medications on file prior to visit.        ALLERGIES  Procaine; Bactrim [sulfamethoxazole-trimethoprim]; and Latex    HISTORY  Past Medical History:   Diagnosis Date   • Asthma    • Fatty liver 2018       Social History     Socioeconomic History   • Marital status:      Spouse name: Not on file   • Number of children: Not on file   • Years of education: Not on file   • Highest education level: Not on file   Tobacco Use   • Smoking status: Never Smoker   • Smokeless tobacco: Never Used   Substance and Sexual Activity   • Alcohol use: No   • Drug use: No   • Sexual activity: Defer       Family History   Problem Relation Age of Onset   • Hypertension Mother    • Cancer Father    • Supraventricular tachycardia Father        Review of Systems   Constitutional: Positive for fatigue. Negative for chills and fever.   HENT: Negative.  Negative for congestion, sinus pressure and sore throat.    Eyes: Positive for visual disturbance.   Respiratory: Negative.  Negative for chest tightness and shortness of breath.    Cardiovascular: Negative.  Negative for chest pain, palpitations and leg swelling.   Gastrointestinal: Positive for diarrhea and nausea (pregnant). Negative for abdominal pain, blood in stool, constipation and vomiting.   Endocrine: Negative.  Negative for cold intolerance and heat intolerance.   Genitourinary: Negative.  Negative for dysuria, frequency, hematuria and urgency.   Musculoskeletal: Negative.  Negative for arthralgias, back pain and neck pain.   Skin: Negative.  Negative for rash and wound.   Allergic/Immunologic: Negative.  Negative for environmental allergies and food allergies.   Neurological: Negative.   "Negative for dizziness, syncope and light-headedness.   Hematological: Negative.  Does not bruise/bleed easily.   Psychiatric/Behavioral: Negative.  Negative for sleep disturbance (denies waking with soa or cp).       Objective     VITALS: /86 (BP Location: Right arm, Patient Position: Sitting)   Pulse 78   Temp 97.1 °F (36.2 °C)   Ht 175.3 cm (69\")   Wt 93.4 kg (206 lb)   LMP  (LMP Unknown)   SpO2 99%   BMI 30.42 kg/m²     LABS:   Lab Results (most recent)     None          IMAGING:   No Images in the past 120 days found..    EXAM:  Physical Exam   Constitutional: She is oriented to person, place, and time. She appears well-developed and well-nourished.   HENT:   Head: Normocephalic and atraumatic.   Eyes: Pupils are equal, round, and reactive to light. EOM are normal.   Neck: Trachea normal and phonation normal. Neck supple. No JVD present. Carotid bruit is not present. No thyroid mass present.   Cardiovascular: Normal rate, regular rhythm, normal heart sounds and intact distal pulses. Exam reveals no gallop and no friction rub.   No murmur heard.  Pulses:       Radial pulses are 2+ on the right side, and 2+ on the left side.        Posterior tibial pulses are 2+ on the right side, and 2+ on the left side.   Pulmonary/Chest: Effort normal and breath sounds normal. No respiratory distress. She has no wheezes. She has no rales.   Abdominal: Soft. Bowel sounds are normal. She exhibits no distension and no abdominal bruit. There is no tenderness.   Musculoskeletal: Normal range of motion. She exhibits no edema.   Neurological: She is alert and oriented to person, place, and time. She has normal strength. No cranial nerve deficit or sensory deficit.   Skin: Skin is warm, dry and intact. Capillary refill takes less than 2 seconds. No rash noted.   Psychiatric: She has a normal mood and affect. Her speech is normal and behavior is normal. Judgment and thought content normal. Cognition and memory are normal.  "   Nursing note and vitals reviewed.      Procedure   Procedures       Assessment/Plan    Diagnosis Plan   1. Essential hypertension  labetalol (NORMODYNE) 100 MG tablet   2. Palpitations  labetalol (NORMODYNE) 100 MG tablet   3. 9 weeks gestation of pregnancy     1.  Patient's blood pressure is well controlled on current blood pressure medication regimen.  No medication will be added at this time.  Patient will continue to hold losartan due to pregnancy.  I will provide her with a prescription for labetalol 100 mg twice daily if her blood pressure does start elevating and staying greater than 140/90 or return of palpitations.  Patient advised to monitor blood pressure on a daily basis and report any persistent highs or lows.  Set goal blood pressure for patient at 130/80 or below.  2.  Patient is doing well from the cardiovascular standpoint denies any angina or anginal equivalent symptoms.  We will continue to monitor patient.  3.  Informed of signs and symptoms of ACS and advised to seek emergent treatment for any new worsening symptoms.  Patient also advised sooner follow-up as needed.  Also advised to follow-up with family doctor as needed  This note is dictated utilizing voice recognition software.  Although this record has been proof read, transcriptional errors may still be present. If questions occur regarding the content of this record please do not hesitate to call our office.    Return in about 6 months (around 2/20/2021), or if symptoms worsen or fail to improve.    China was seen today for palpitations and hypertension.    Diagnoses and all orders for this visit:    Essential hypertension  -     labetalol (NORMODYNE) 100 MG tablet; Take 1 tablet by mouth 2 (Two) Times a Day.    Palpitations  -     labetalol (NORMODYNE) 100 MG tablet; Take 1 tablet by mouth 2 (Two) Times a Day.    9 weeks gestation of pregnancy        China Carbajal  reports that she has never smoked. She has never used smokeless tobacco..         Patient's Body mass index is 30.42 kg/m². BMI is above normal parameters. Recommendations include: educational material.           MEDS ORDERED DURING VISIT:  New Medications Ordered This Visit   Medications   • labetalol (NORMODYNE) 100 MG tablet     Sig: Take 1 tablet by mouth 2 (Two) Times a Day.     Dispense:  60 tablet     Refill:  9           This document has been electronically signed by Weston Hercules Jr., OMKAR  August 20, 2020 10:09

## 2020-08-20 NOTE — PATIENT INSTRUCTIONS
How to Quarantine at Home  Information for Patients and Families    These instructions are for people with confirmed or suspected COVID-19 who do not need to be hospitalized and those with confirmed COVID-19 who were hospitalized and discharged to care for themselves at home.    If you were tested through the Health Department  The Health Department will monitor your wellbeing.  If it is determined that you do not need to be hospitalized and can be isolated at home, you will be monitored by staff from your local or state health department.     If you were tested through a [facility]  You will need to monitor yourself and report changes in your symptoms to your doctor.  See the section below called Monitor Your Symptoms.    Follow these steps until a healthcare provider or local or state health department says you can return to your normal activities.    Stay home except to get medical care  • Restrict activities outside your home, except for getting medical care.   • Do not go to work, school, or public areas.   • Avoid using public transportation, ride-sharing, or taxis.    Separate yourself from other people and animals in your home  People  As much as possible, you should stay in a specific room and away from other people in your home. Also, you should use a separate bathroom, if available.    Animals  You should restrict contact with pets and other animals while you are sick with COVID-19, just like you would around other people. When possible, have another member of your household care for your animals while you are sick. If you are sick with COVID-19, avoid contact with your pet, including petting, snuggling, being kissed or licked, and sharing food. If you must care for your pet or be around animals while you are sick, wash your hands before and after you interact with pets and wear a facemask. See COVID-19 and Animals for more information.    Call ahead before visiting your doctor  If you have a medical  appointment, call the healthcare provider and tell them that you have or may have COVID-19. This information will help the healthcare provider’s office take steps to keep other people from getting infected or exposed.    Wear a facemask  You should wear a facemask when you are around other people (e.g., sharing a room or vehicle) or pets and before you enter a healthcare provider’s office.     If you are not able to wear a facemask (for example, because it causes trouble breathing), then people who live with you should not stay in the same room with you, or they should wear a facemask if they enter your room.    Cover your coughs and sneezes  • Cover your mouth and nose with a tissue when you cough or sneeze.   • Throw used tissues in a lined trash can.   • Immediately wash your hands with soap and water for at least 20 seconds or, if soap and water are not available, clean your hands with an alcohol-based hand  that contains at least 60% alcohol.    Clean your hands often  • Wash your hands often with soap and water for at least 20 seconds, especially after blowing your nose, coughing, or sneezing; going to the bathroom; and before eating or preparing food.     • If soap and water are not readily available, use an alcohol-based hand  with at least 60% alcohol, covering all surfaces of your hands and rubbing them together until they feel dry.    • Soap and water are the best option if hands are visibly dirty. Avoid touching your eyes, nose, and mouth with unwashed hands.    Avoid sharing personal household items  • You should not share dishes, drinking glasses, cups, eating utensils, towels, or bedding with other people or pets in your home.   • After using these items, they should be washed thoroughly with soap and water.    Clean all “high-touch” surfaces everyday  • High touch surfaces include counters, tabletops, doorknobs, bathroom fixtures, toilets, phones, keyboards, tablets, and bedside  tables.   • Also, clean any surfaces that may have blood, stool, or body fluids on them.   • Use a household cleaning spray or wipe, according to the label instructions. Labels contain instructions for safe and effective use of the cleaning product, including precautions you should take when applying the product, such as wearing gloves and making sure you have good ventilation during use of the product.    Monitor your symptoms  • Seek prompt medical attention if your illness is worsening (e.g., difficulty breathing).   • Before seeking care, call your healthcare provider and tell them that you have, or are being evaluated for, COVID-19.   • Put on a facemask before you enter the facility.     • These steps will help the healthcare provider’s office to keep other people in the office or waiting room from getting infected or exposed.   • Persons who are placed under active monitoring or facilitated self-monitoring should follow instructions provided by their local health department or occupational health professionals, as appropriate.  • If you have a medical emergency and need to call 911, notify the dispatch personnel that you have, or are being evaluated for COVID-19. If possible, put on a facemask before emergency medical services arrive.    Discontinuing home isolation  Patients with confirmed COVID-19 should remain under home isolation precautions until the risk of secondary transmission to others is thought to be low. The decision to discontinue home isolation precautions should be made on a case-by-case basis, in consultation with healthcare providers and state and local health departments.    The below content are for household members, intimate partners, and caregivers of a patient with symptomatic laboratory-confirmed COVID-19 or a patient under investigation:    Household members, intimate partners, and caregivers may have close contact with a person with symptomatic, laboratory-confirmed COVID-19 or a  person under investigation.     Close contacts should monitor their health; they should call their healthcare provider right away if they develop symptoms suggestive of COVID-19 (e.g., fever, cough, shortness of breath)     Close contacts should also follow these recommendations:  • Make sure that you understand and can help the patient follow their healthcare provider’s instructions for medication(s) and care. You should help the patient with basic needs in the home and provide support for getting groceries, prescriptions, and other personal needs.  • Monitor the patient’s symptoms. If the patient is getting sicker, call his or her healthcare provider and tell them that the patient has laboratory-confirmed COVID-19. This will help the healthcare provider’s office take steps to keep other people in the office or waiting room from getting infected. Ask the healthcare provider to call the local or [facility] health department for additional guidance. If the patient has a medical emergency and you need to call 911, notify the dispatch personnel that the patient has, or is being evaluated for COVID-19.  • Household members should stay in another room or be  from the patient as much as possible. Household members should use a separate bedroom and bathroom, if available.  • Prohibit visitors who do not have an essential need to be in the home.  • Household members should care for any pets in the home. Do not handle pets or other animals while sick.  For more information, see COVID-19 and Animals.  • Make sure that shared spaces in the home have good air flow, such as by an air conditioner or an opened window, weather permitting.  • Perform hand hygiene frequently. Wash your hands often with soap and water for at least 20 seconds or use an alcohol-based hand  that contains 60 to 95% alcohol, covering all surfaces of your hands and rubbing them together until they feel dry. Soap and water should be used  preferentially if hands are visibly dirty.  • Avoid touching your eyes, nose, and mouth with unwashed hands.  • The patient should wear a facemask when you are around other people. If the patient is not able to wear a facemask (for example, because it causes trouble breathing), you, as the caregiver, should wear a mask when you are in the same room as the patient.  • Wear a disposable facemask and gloves when you touch or have contact with the patient’s blood, stool, or body fluids, such as saliva, sputum, nasal mucus, vomit, or urine.   o Throw out disposable facemasks and gloves after using them. Do not reuse.  o When removing personal protective equipment, first remove and dispose of gloves. Then, immediately clean your hands with soap and water or alcohol-based hand . Next, remove and dispose of facemask, and immediately clean your hands again with soap and water or alcohol-based hand .  • Avoid sharing household items with the patient. You should not share dishes, drinking glasses, cups, eating utensils, towels, bedding, or other items. After the patient uses these items, you should wash them thoroughly (see below “Wash laundry thoroughly”).  • Clean all “high-touch” surfaces, such as counters, tabletops, doorknobs, bathroom fixtures, toilets, phones, keyboards, tablets, and bedside tables, every day. Also, clean any surfaces that may have blood, stool, or body fluids on them.   o Use a household cleaning spray or wipe, according to the label instructions. Labels contain instructions for safe and effective use of the cleaning product including precautions you should take when applying the product, such as wearing gloves and making sure you have good ventilation during use of the product.  • Wash laundry thoroughly.   o Immediately remove and wash clothes or bedding that have blood, stool, or body fluids on them.  o Wear disposable gloves while handling soiled items and keep soiled items away  from your body. Clean your hands (with soap and water or an alcohol-based hand ) immediately after removing your gloves.  o Read and follow directions on labels of laundry or clothing items and detergent. In general, using a normal laundry detergent according to washing machine instructions and dry thoroughly using the warmest temperatures recommended on the clothing label.  • Place all used disposable gloves, facemasks, and other contaminated items in a lined container before disposing of them with other household waste. Clean your hands (with soap and water or an alcohol-based hand ) immediately after handling these items. Soap and water should be used preferentially if hands are visibly dirty.  • Discuss any additional questions with your state or local health department or healthcare provider.    Adapted from information provided by the Centers for Disease Control and Prevention.  For more information, visit https://www.cdc.gov/coronavirus/2019-ncov/hcp/guidance-prevent-spread.htmlBMI for Adults    Body mass index (BMI) is a number that is calculated from a person's weight and height. BMI may help to estimate how much of a person's weight is composed of fat. BMI can help identify those who may be at higher risk for certain medical problems.  How is BMI used with adults?  BMI is used as a screening tool to identify possible weight problems. It is used to check whether a person is obese, overweight, healthy weight, or underweight.  How is BMI calculated?  BMI measures your weight and compares it to your height. This can be done either in English ([place] or metric measurements. Note that charts are available to help you find your BMI quickly and easily without having to do these calculations yourself.  To calculate your BMI in English ([place] measurements, your health care provider will:  1. Measure your weight in pounds (lb).  2. Multiply the number of pounds by 703.  ? For example, for a person  "who weighs 180 lb, multiply that number by 703, which equals 126,540.  3. Measure your height in inches (in). Then multiply that number by itself to get a measurement called \"inches squared.\"  ? For example, for a person who is 70 in tall, the \"inches squared\" measurement is 70 in x 70 in, which equals 4900 inches squared.  4. Divide the total from Step 2 (number of lb x 703) by the total from Step 3 (inches squared): 126,540 ÷ 4900 = 25.8. This is your BMI.  To calculate your BMI in metric measurements, your health care provider will:  1. Measure your weight in kilograms (kg).  2. Measure your height in meters (m). Then multiply that number by itself to get a measurement called \"meters squared.\"  ? For example, for a person who is 1.75 m tall, the \"meters squared\" measurement is 1.75 m x 1.75 m, which is equal to 3.1 meters squared.  3. Divide the number of kilograms (your weight) by the meters squared number. In this example: 70 ÷ 3.1 = 22.6. This is your BMI.  How is BMI interpreted?  To interpret your results, your health care provider will use BMI charts to identify whether you are underweight, normal weight, overweight, or obese. The following guidelines will be used:  · Underweight: BMI less than 18.5.  · Normal weight: BMI between 18.5 and 24.9.  · Overweight: BMI between 25 and 29.9.  · Obese: BMI of 30 and above.  Please note:  · Weight includes both fat and muscle, so someone with a muscular build, such as an athlete, may have a BMI that is higher than 24.9. In cases like these, BMI is not an accurate measure of body fat.  · To determine if excess body fat is the cause of a BMI of 25 or higher, further assessments may need to be done by a health care provider.  · BMI is usually interpreted in the same way for men and women.  Why is BMI a useful tool?  BMI is useful in two ways:  · Identifying a weight problem that may be related to a medical condition, or that may increase the risk for medical " problems.  · Promoting lifestyle and diet changes in order to reach a healthy weight.  Summary  · Body mass index (BMI) is a number that is calculated from a person's weight and height.  · BMI may help to estimate how much of a person's weight is composed of fat. BMI can help identify those who may be at higher risk for certain medical problems.  · BMI can be measured using English measurements or metric measurements.  · To interpret your results, your health care provider will use BMI charts to identify whether you are underweight, normal weight, overweight, or obese.  This information is not intended to replace advice given to you by your health care provider. Make sure you discuss any questions you have with your health care provider.  Document Released: 08/29/2005 Document Revised: 11/30/2018 Document Reviewed: 10/31/2018  [employer] Patient Education © 2020 [employer]    Hypertension, Adult  Hypertension is another name for high blood pressure. High blood pressure forces your heart to work harder to pump blood. This can cause problems over time.  There are two numbers in a blood pressure reading. There is a top number (systolic) over a bottom number (diastolic). It is best to have a blood pressure that is below 120/80. Healthy choices can help lower your blood pressure, or you may need medicine to help lower it.  What are the causes?  The cause of this condition is not known. Some conditions may be related to high blood pressure.  What increases the risk?  · Smoking.  · Having type 2 diabetes mellitus, high cholesterol, or both.  · Not getting enough exercise or physical activity.  · Being overweight.  · Having too much fat, sugar, calories, or salt (sodium) in your diet.  · Drinking too much alcohol.  · Having long-term (chronic) kidney disease.  · Having a family history of high blood pressure.  · Age. Risk increases with age.  · Race. You may be at higher risk if you are .  · Gender. Men are  at higher risk than women before age 45. After age 65, women are at higher risk than men.  · Having obstructive sleep apnea.  · Stress.  What are the signs or symptoms?  · High blood pressure may not cause symptoms. Very high blood pressure (hypertensive crisis) may cause:  ? Headache.  ? Feelings of worry or nervousness (anxiety).  ? Shortness of breath.  ? Nosebleed.  ? A feeling of being sick to your stomach (nausea).  ? Throwing up (vomiting).  ? Changes in how you see.  ? Very bad chest pain.  ? Seizures.  How is this treated?  · This condition is treated by making healthy lifestyle changes, such as:  ? Eating healthy foods.  ? Exercising more.  ? Drinking less alcohol.  · Your health care provider may prescribe medicine if lifestyle changes are not enough to get your blood pressure under control, and if:  ? Your top number is above 130.  ? Your bottom number is above 80.  · Your personal target blood pressure may vary.  Follow these instructions at home:  Eating and drinking    · If told, follow the DASH eating plan. To follow this plan:  ? Fill one half of your plate at each meal with fruits and vegetables.  ? Fill one fourth of your plate at each meal with whole grains. Whole grains include whole-wheat pasta, brown rice, and whole-grain bread.  ? Eat or drink low-fat dairy products, such as skim milk or low-fat yogurt.  ? Fill one fourth of your plate at each meal with low-fat (lean) proteins. Low-fat proteins include fish, chicken without skin, eggs, beans, and tofu.  ? Avoid fatty meat, cured and processed meat, or chicken with skin.  ? Avoid pre-made or processed food.  · Eat less than 1,500 mg of salt each day.  · Do not drink alcohol if:  ? Your doctor tells you not to drink.  ? You are pregnant, may be pregnant, or are planning to become pregnant.  · If you drink alcohol:  ? Limit how much you use to:  § 0-1 drink a day for women.  § 0-2 drinks a day for men.  ? Be aware of how much alcohol is in your  drink. In the [place] one drink equals one 12 oz bottle of beer (355 mL), one 5 oz glass of wine (148 mL), or one 1½ oz glass of hard liquor (44 mL).  Lifestyle    · Work with your doctor to stay at a healthy weight or to lose weight. Ask your doctor what the best weight is for you.  · Get at least 30 minutes of exercise most days of the week. This may include walking, swimming, or biking.  · Get at least 30 minutes of exercise that strengthens your muscles (resistance exercise) at least 3 days a week. This may include lifting weights or doing Pilates.  · Do not use any products that contain nicotine or tobacco, such as cigarettes, e-cigarettes, and chewing tobacco. If you need help quitting, ask your doctor.  · Check your blood pressure at home as told by your doctor.  · Keep all follow-up visits as told by your doctor. This is important.  Medicines  · Take over-the-counter and prescription medicines only as told by your doctor. Follow directions carefully.  · Do not skip doses of blood pressure medicine. The medicine does not work as well if you skip doses. Skipping doses also puts you at risk for problems.  · Ask your doctor about side effects or reactions to medicines that you should watch for.  Contact a doctor if you:  · Think you are having a reaction to the medicine you are taking.  · Have headaches that keep coming back (recurring).  · Feel dizzy.  · Have swelling in your ankles.  · Have trouble with your vision.  Get help right away if you:  · Get a very bad headache.  · Start to feel mixed up (confused).  · Feel weak or numb.  · Feel faint.  · Have very bad pain in your:  ? Chest.  ? Belly (abdomen).  · Throw up more than once.  · Have trouble breathing.  Summary  · Hypertension is another name for high blood pressure.  · High blood pressure forces your heart to work harder to pump blood.  · For most people, a normal blood pressure is less than 120/80.  · Making healthy choices can help lower blood  pressure. If your blood pressure does not get lower with healthy choices, you may need to take medicine.  This information is not intended to replace advice given to you by your health care provider. Make sure you discuss any questions you have with your health care provider.  Document Released: 06/05/2009 Document Revised: 08/28/2019 Document Reviewed: 08/28/2019  [employer] Patient Education © 2020 [employer]    Acute Coronary Syndrome  Acute coronary syndrome (ACS) is a serious problem in which there is suddenly not enough blood and oxygen reaching the heart. ACS can result in chest pain or a heart attack.  This condition is a medical emergency. If you have any symptoms of this condition, get help right away.  What are the causes?  This condition may be caused by:  · A buildup of fat and cholesterol inside the arteries (atherosclerosis). This is the most common cause. The buildup (plaque) can cause blood vessels in the heart (coronary arteries) to become narrow or blocked, which reduces blood flow to the heart. Plaque can also break off and lead to a clot, which can block an artery and cause a heart attack or stroke.  · Sudden tightening of the muscles around the coronary arteries (coronary spasm).  · Tearing of a coronary artery (spontaneous coronary artery dissection).  · Very low blood pressure (hypotension).  · An abnormal heartbeat (arrhythmia).  · Other medical conditions that cause a decrease of oxygen to the heart, such as anemiaorrespiratory failure.  · Using cocaine or methamphetamine.  What increases the risk?  The following factors may make you more likely to develop this condition:  · Age. The risk for ACS increases as you get older.  · History of chest pain, heart attack, peripheral artery disease, or stroke.  · Having taken chemotherapy or immune-suppressing medicines.  · Being male.  · Family history of chest pain, heart disease, or stroke.  · Smoking.  · Not exercising enough.  · Being  overweight.  · High cholesterol.  · High blood pressure (hypertension).  · Diabetes.  · Excessive alcohol use.  What are the signs or symptoms?  Common symptoms of this condition include:  · Chest pain. The pain may last a long time, or it may stop and come back (recur). It may feel like:  ? Crushing or squeezing.  ? Tightness, pressure, fullness, or heaviness.  · Arm, neck, jaw, or back pain.  · Heartburn or indigestion.  · Shortness of breath.  · Nausea.  · Sudden cold sweats.  · Light-headedness.  · Dizziness or passing out.  · Tiredness (fatigue).  Sometimes there are no symptoms.  How is this diagnosed?  This condition may be diagnosed based on:  · Your medical history and symptoms.  · Imaging tests, such as:  ? An electrocardiogram (ECG). This measures the heart's electrical activity.  ? X-rays.  ? CT scan.  ? A coronary angiogram. For this test, dye is injected into the heart arteries and then X-rays are taken.  ? Myocardial perfusion imaging. This test shows how well blood flows through your heart muscle.  · Blood tests. These may be repeated at certain time intervals.  · Exercise stress testing.  · Echocardiogram. This is a test that uses sound waves to produce detailed images of the heart.  How is this treated?  Treatment for this condition may include:  · Oxygen therapy.  · Medicines, such as:  ? Antiplatelet medicines and blood-thinning medicines, such as aspirin. These help prevent blood clots.  ? Medicine that dissolves any blood clots (fibrinolytic therapy).  ? Blood pressure medicines.  ? Nitroglycerin. This helps widen blood vessels to improve blood flow.  ? Pain medicine.  ? Cholesterol-lowering medicine.  · Surgery, such as:  ? Coronary angioplasty with stent placement. This involves placing a small piece of metal that looks like mesh or a spring into a narrow coronary artery. This widens the artery and keeps it open.  ? Coronary artery bypass surgery. This involves taking a section of a blood  vessel from a different part of your body and placing it on the blocked coronary artery to allow blood to flow around the blockage.  · Cardiac rehabilitation. This is a program that includes exercise training, education, and counseling to help you recover.  Follow these instructions at home:  Eating and drinking  · Eat a heart-healthy diet that includes whole grains, fruits and vegetables, lean proteins, and low-fat or nonfat dairy products.  · Limit how much salt (sodium) you eat as told by your health care provider. Follow instructions from your health care provider about any other eating or drinking restrictions, such as limiting foods that are high in fat and processed sugars.  · Use healthy cooking methods such as roasting, grilling, broiling, baking, poaching, steaming, or stir-frying.  · Work with a dietitian to follow a heart-healthy eating plan.  Medicines  · Take over-the-counter and prescription medicines only as told by your health care provider.  · Do not take these medicines unless your health care provider approves:  ? Vitamin supplements that contain vitamin A or vitamin E.  ? NSAIDs, such as ibuprofen, naproxen, or celecoxib.  ? Hormone replacement therapy that contains estrogen.  · If you are taking blood thinners:  ? Talk with your health care provider before you take any medicines that contain aspirin or NSAIDs. These medicines increase your risk for dangerous bleeding.  ? Take your medicine exactly as told, at the same time every day.  ? Avoid activities that could cause injury or bruising, and follow instructions about how to prevent falls.  ? Wear a medical alert bracelet, and carry a card that lists what medicines you take.  Activity  · Follow your cardiac rehabilitation program. Do exercises as told by your physical therapist.  · Ask your health care provider what activities and exercises are safe for you. Follow his or her instructions about lifting, driving, or climbing  stairs.  Lifestyle  · Do not use any products that contain nicotine or tobacco, such as cigarettes, e-cigarettes, and chewing tobacco. If you need help quitting, ask your health care provider.  · Do not drink alcohol if your health care provider tells you not to drink.  · If you drink alcohol:  ? Limit how much you have to 0-1 drink a day.  ? Be aware of how much alcohol is in your drink. In the [place] one drink equals one 12 oz bottle of beer (355 mL), one 5 oz glass of wine (148 mL), or one 1½ oz glass of hard liquor (44 mL).  · Maintain a healthy weight. If you need to lose weight, work with your health care provider to do so safely.  General instructions  · Tell all the health care providers who provide care for you about your heart condition, including your dentist. This may affect the medicines or treatment you receive.  · Manage any other health conditions you have, such as hypertension or diabetes. These conditions affect your heart.  · Pay attention to your mental health. You may be at higher risk for depression.  ? Find ways to manage stress.  ? Talk to your health care provider about depression screening and treatment.  · Keep your vaccinations up to date.  ? Get the flu shot (influenza vaccine) every year.  ? Get the pneumococcal vaccine if you are age 65 or older.  · If directed, monitor your blood pressure at home.  · Keep all follow-up visits as told by your health care provider. This is important.  Contact a health care provider if you:  · Feel overwhelmed or sad.  · Have trouble doing your daily activities.  Get help right away if you:  · Have pain in your chest, neck, arm, jaw, stomach, or back that recurs, and:  ? It lasts for more than a few minutes.  ? It is not relieved by taking the medicineyour health care provider prescribed.  · Have unexplained:  ? Heavy sweating.  ? Heartburn or indigestion.  ? Nausea or vomiting.  ? Shortness of breath.  ? Difficulty breathing.  ? Fatigue.  ? Nervousness  or anxiety.  ? Weakness.  ? Diarrhea.  ? Dark stools or blood in your stool.  · Have sudden light-headedness or dizziness.  · Have blood pressure that is higher than 180/120.  · Faint.  · Have thoughts about hurting yourself.  These symptoms may represent a serious problem that is an emergency. Do not wait to see if the symptoms will go away. Get medical help right away. Call your local emergency services (911 in the [place] Do not drive yourself to the hospital.   Summary  · Acute coronary syndrome (ACS) is when there is not enough blood and oxygen being supplied to the heart. ACS can result in chest pain or a heart attack.  · Acute coronary syndrome is a medical emergency. If you have any symptoms of this condition, get help right away.  · Treatment includes medicines and procedures to open the blocked arteries and restore blood flow.  This information is not intended to replace advice given to you by your health care provider. Make sure you discuss any questions you have with your health care provider.  Document Released: 12/18/2006 Document Revised: 05/20/2020 Document Reviewed: 12/30/2019  [employer] Patient Education © 2020 [employer]    Acute Coronary Syndrome  Acute coronary syndrome (ACS) is a serious problem in which there is suddenly not enough blood and oxygen reaching the heart. ACS can result in chest pain or a heart attack.  This condition is a medical emergency. If you have any symptoms of this condition, get help right away.  What are the causes?  This condition may be caused by:  · A buildup of fat and cholesterol inside the arteries (atherosclerosis). This is the most common cause. The buildup (plaque) can cause blood vessels in the heart (coronary arteries) to become narrow or blocked, which reduces blood flow to the heart. Plaque can also break off and lead to a clot, which can block an artery and cause a heart attack or stroke.  · Sudden tightening of the muscles around the coronary arteries  (coronary spasm).  · Tearing of a coronary artery (spontaneous coronary artery dissection).  · Very low blood pressure (hypotension).  · An abnormal heartbeat (arrhythmia).  · Other medical conditions that cause a decrease of oxygen to the heart, such as anemiaorrespiratory failure.  · Using cocaine or methamphetamine.  What increases the risk?  The following factors may make you more likely to develop this condition:  · Age. The risk for ACS increases as you get older.  · History of chest pain, heart attack, peripheral artery disease, or stroke.  · Having taken chemotherapy or immune-suppressing medicines.  · Being male.  · Family history of chest pain, heart disease, or stroke.  · Smoking.  · Not exercising enough.  · Being overweight.  · High cholesterol.  · High blood pressure (hypertension).  · Diabetes.  · Excessive alcohol use.  What are the signs or symptoms?  Common symptoms of this condition include:  · Chest pain. The pain may last a long time, or it may stop and come back (recur). It may feel like:  ? Crushing or squeezing.  ? Tightness, pressure, fullness, or heaviness.  · Arm, neck, jaw, or back pain.  · Heartburn or indigestion.  · Shortness of breath.  · Nausea.  · Sudden cold sweats.  · Light-headedness.  · Dizziness or passing out.  · Tiredness (fatigue).  Sometimes there are no symptoms.  How is this diagnosed?  This condition may be diagnosed based on:  · Your medical history and symptoms.  · Imaging tests, such as:  ? An electrocardiogram (ECG). This measures the heart's electrical activity.  ? X-rays.  ? CT scan.  ? A coronary angiogram. For this test, dye is injected into the heart arteries and then X-rays are taken.  ? Myocardial perfusion imaging. This test shows how well blood flows through your heart muscle.  · Blood tests. These may be repeated at certain time intervals.  · Exercise stress testing.  · Echocardiogram. This is a test that uses sound waves to produce detailed images of the  heart.  How is this treated?  Treatment for this condition may include:  · Oxygen therapy.  · Medicines, such as:  ? Antiplatelet medicines and blood-thinning medicines, such as aspirin. These help prevent blood clots.  ? Medicine that dissolves any blood clots (fibrinolytic therapy).  ? Blood pressure medicines.  ? Nitroglycerin. This helps widen blood vessels to improve blood flow.  ? Pain medicine.  ? Cholesterol-lowering medicine.  · Surgery, such as:  ? Coronary angioplasty with stent placement. This involves placing a small piece of metal that looks like mesh or a spring into a narrow coronary artery. This widens the artery and keeps it open.  ? Coronary artery bypass surgery. This involves taking a section of a blood vessel from a different part of your body and placing it on the blocked coronary artery to allow blood to flow around the blockage.  · Cardiac rehabilitation. This is a program that includes exercise training, education, and counseling to help you recover.  Follow these instructions at home:  Eating and drinking  · Eat a heart-healthy diet that includes whole grains, fruits and vegetables, lean proteins, and low-fat or nonfat dairy products.  · Limit how much salt (sodium) you eat as told by your health care provider. Follow instructions from your health care provider about any other eating or drinking restrictions, such as limiting foods that are high in fat and processed sugars.  · Use healthy cooking methods such as roasting, grilling, broiling, baking, poaching, steaming, or stir-frying.  · Work with a dietitian to follow a heart-healthy eating plan.  Medicines  · Take over-the-counter and prescription medicines only as told by your health care provider.  · Do not take these medicines unless your health care provider approves:  ? Vitamin supplements that contain vitamin A or vitamin E.  ? NSAIDs, such as ibuprofen, naproxen, or celecoxib.  ? Hormone replacement therapy that contains  estrogen.  · If you are taking blood thinners:  ? Talk with your health care provider before you take any medicines that contain aspirin or NSAIDs. These medicines increase your risk for dangerous bleeding.  ? Take your medicine exactly as told, at the same time every day.  ? Avoid activities that could cause injury or bruising, and follow instructions about how to prevent falls.  ? Wear a medical alert bracelet, and carry a card that lists what medicines you take.  Activity  · Follow your cardiac rehabilitation program. Do exercises as told by your physical therapist.  · Ask your health care provider what activities and exercises are safe for you. Follow his or her instructions about lifting, driving, or climbing stairs.  Lifestyle  · Do not use any products that contain nicotine or tobacco, such as cigarettes, e-cigarettes, and chewing tobacco. If you need help quitting, ask your health care provider.  · Do not drink alcohol if your health care provider tells you not to drink.  · If you drink alcohol:  ? Limit how much you have to 0-1 drink a day.  ? Be aware of how much alcohol is in your drink. In the [place] one drink equals one 12 oz bottle of beer (355 mL), one 5 oz glass of wine (148 mL), or one 1½ oz glass of hard liquor (44 mL).  · Maintain a healthy weight. If you need to lose weight, work with your health care provider to do so safely.  General instructions  · Tell all the health care providers who provide care for you about your heart condition, including your dentist. This may affect the medicines or treatment you receive.  · Manage any other health conditions you have, such as hypertension or diabetes. These conditions affect your heart.  · Pay attention to your mental health. You may be at higher risk for depression.  ? Find ways to manage stress.  ? Talk to your health care provider about depression screening and treatment.  · Keep your vaccinations up to date.  ? Get the flu shot (influenza vaccine)  every year.  ? Get the pneumococcal vaccine if you are age 65 or older.  · If directed, monitor your blood pressure at home.  · Keep all follow-up visits as told by your health care provider. This is important.  Contact a health care provider if you:  · Feel overwhelmed or sad.  · Have trouble doing your daily activities.  Get help right away if you:  · Have pain in your chest, neck, arm, jaw, stomach, or back that recurs, and:  ? It lasts for more than a few minutes.  ? It is not relieved by taking the medicineyour health care provider prescribed.  · Have unexplained:  ? Heavy sweating.  ? Heartburn or indigestion.  ? Nausea or vomiting.  ? Shortness of breath.  ? Difficulty breathing.  ? Fatigue.  ? Nervousness or anxiety.  ? Weakness.  ? Diarrhea.  ? Dark stools or blood in your stool.  · Have sudden light-headedness or dizziness.  · Have blood pressure that is higher than 180/120.  · Faint.  · Have thoughts about hurting yourself.  These symptoms may represent a serious problem that is an emergency. Do not wait to see if the symptoms will go away. Get medical help right away. Call your local emergency services (911 in the [place] Do not drive yourself to the hospital.   Summary  · Acute coronary syndrome (ACS) is when there is not enough blood and oxygen being supplied to the heart. ACS can result in chest pain or a heart attack.  · Acute coronary syndrome is a medical emergency. If you have any symptoms of this condition, get help right away.  · Treatment includes medicines and procedures to open the blocked arteries and restore blood flow.  This information is not intended to replace advice given to you by your health care provider. Make sure you discuss any questions you have with your health care provider.  Document Released: 12/18/2006 Document Revised: 05/20/2020 Document Reviewed: 12/30/2019  [employer] Patient Education © 2020 [employer]    Hypertension, Adult  Hypertension is another name for high blood  pressure. High blood pressure forces your heart to work harder to pump blood. This can cause problems over time.  There are two numbers in a blood pressure reading. There is a top number (systolic) over a bottom number (diastolic). It is best to have a blood pressure that is below 120/80. Healthy choices can help lower your blood pressure, or you may need medicine to help lower it.  What are the causes?  The cause of this condition is not known. Some conditions may be related to high blood pressure.  What increases the risk?  · Smoking.  · Having type 2 diabetes mellitus, high cholesterol, or both.  · Not getting enough exercise or physical activity.  · Being overweight.  · Having too much fat, sugar, calories, or salt (sodium) in your diet.  · Drinking too much alcohol.  · Having long-term (chronic) kidney disease.  · Having a family history of high blood pressure.  · Age. Risk increases with age.  · Race. You may be at higher risk if you are .  · Gender. Men are at higher risk than women before age 45. After age 65, women are at higher risk than men.  · Having obstructive sleep apnea.  · Stress.  What are the signs or symptoms?  · High blood pressure may not cause symptoms. Very high blood pressure (hypertensive crisis) may cause:  ? Headache.  ? Feelings of worry or nervousness (anxiety).  ? Shortness of breath.  ? Nosebleed.  ? A feeling of being sick to your stomach (nausea).  ? Throwing up (vomiting).  ? Changes in how you see.  ? Very bad chest pain.  ? Seizures.  How is this treated?  · This condition is treated by making healthy lifestyle changes, such as:  ? Eating healthy foods.  ? Exercising more.  ? Drinking less alcohol.  · Your health care provider may prescribe medicine if lifestyle changes are not enough to get your blood pressure under control, and if:  ? Your top number is above 130.  ? Your bottom number is above 80.  · Your personal target blood pressure may vary.  Follow these  instructions at home:  Eating and drinking    · If told, follow the DASH eating plan. To follow this plan:  ? Fill one half of your plate at each meal with fruits and vegetables.  ? Fill one fourth of your plate at each meal with whole grains. Whole grains include whole-wheat pasta, brown rice, and whole-grain bread.  ? Eat or drink low-fat dairy products, such as skim milk or low-fat yogurt.  ? Fill one fourth of your plate at each meal with low-fat (lean) proteins. Low-fat proteins include fish, chicken without skin, eggs, beans, and tofu.  ? Avoid fatty meat, cured and processed meat, or chicken with skin.  ? Avoid pre-made or processed food.  · Eat less than 1,500 mg of salt each day.  · Do not drink alcohol if:  ? Your doctor tells you not to drink.  ? You are pregnant, may be pregnant, or are planning to become pregnant.  · If you drink alcohol:  ? Limit how much you use to:  § 0-1 drink a day for women.  § 0-2 drinks a day for men.  ? Be aware of how much alcohol is in your drink. In the [place] one drink equals one 12 oz bottle of beer (355 mL), one 5 oz glass of wine (148 mL), or one 1½ oz glass of hard liquor (44 mL).  Lifestyle    · Work with your doctor to stay at a healthy weight or to lose weight. Ask your doctor what the best weight is for you.  · Get at least 30 minutes of exercise most days of the week. This may include walking, swimming, or biking.  · Get at least 30 minutes of exercise that strengthens your muscles (resistance exercise) at least 3 days a week. This may include lifting weights or doing Pilates.  · Do not use any products that contain nicotine or tobacco, such as cigarettes, e-cigarettes, and chewing tobacco. If you need help quitting, ask your doctor.  · Check your blood pressure at home as told by your doctor.  · Keep all follow-up visits as told by your doctor. This is important.  Medicines  · Take over-the-counter and prescription medicines only as told by your doctor. Follow  directions carefully.  · Do not skip doses of blood pressure medicine. The medicine does not work as well if you skip doses. Skipping doses also puts you at risk for problems.  · Ask your doctor about side effects or reactions to medicines that you should watch for.  Contact a doctor if you:  · Think you are having a reaction to the medicine you are taking.  · Have headaches that keep coming back (recurring).  · Feel dizzy.  · Have swelling in your ankles.  · Have trouble with your vision.  Get help right away if you:  · Get a very bad headache.  · Start to feel mixed up (confused).  · Feel weak or numb.  · Feel faint.  · Have very bad pain in your:  ? Chest.  ? Belly (abdomen).  · Throw up more than once.  · Have trouble breathing.  Summary  · Hypertension is another name for high blood pressure.  · High blood pressure forces your heart to work harder to pump blood.  · For most people, a normal blood pressure is less than 120/80.  · Making healthy choices can help lower blood pressure. If your blood pressure does not get lower with healthy choices, you may need to take medicine.  This information is not intended to replace advice given to you by your health care provider. Make sure you discuss any questions you have with your health care provider.  Document Released: 06/05/2009 Document Revised: 08/28/2019 Document Reviewed: 08/28/2019  [employer] Patient Education © 2020 [employer]

## 2020-08-27 LAB
EXTERNAL CHLAMYDIA SCREEN: NEGATIVE
EXTERNAL GONORRHEA SCREEN: NEGATIVE
EXTERNAL HEPATITIS B SURFACE ANTIGEN: NEGATIVE
EXTERNAL HEPATITIS C AB: NEGATIVE
EXTERNAL RUBELLA QUALITATIVE: NORMAL
EXTERNAL SYPHILIS RPR SCREEN: NORMAL
EXTERNAL URINE DRUG SCREEN: NEGATIVE
HIV1 P24 AG SERPL QL IA: NORMAL

## 2021-01-14 ENCOUNTER — TRANSCRIBE ORDERS (OUTPATIENT)
Dept: OBSTETRICS AND GYNECOLOGY | Facility: HOSPITAL | Age: 36
End: 2021-01-14

## 2021-01-14 DIAGNOSIS — O10.019 PRE-EXISTING ESSENTIAL HYPERTENSION COMPLICATING PREGNANCY, UNSPECIFIED TRIMESTER: ICD-10-CM

## 2021-01-14 DIAGNOSIS — O09.299: ICD-10-CM

## 2021-01-14 DIAGNOSIS — O09.529 AMA (ADVANCED MATERNAL AGE) MULTIGRAVIDA 35+, UNSPECIFIED TRIMESTER: Primary | ICD-10-CM

## 2021-01-14 DIAGNOSIS — Z98.891 HISTORY OF C-SECTION: ICD-10-CM

## 2021-01-21 ENCOUNTER — HOSPITAL ENCOUNTER (OUTPATIENT)
Dept: WOMENS IMAGING | Facility: HOSPITAL | Age: 36
Discharge: HOME | End: 2021-01-21
Admitting: NURSE PRACTITIONER

## 2021-01-21 ENCOUNTER — OFFICE VISIT (OUTPATIENT)
Dept: OBSTETRICS AND GYNECOLOGY | Facility: HOSPITAL | Age: 36
End: 2021-01-21

## 2021-01-21 VITALS — SYSTOLIC BLOOD PRESSURE: 136 MMHG | DIASTOLIC BLOOD PRESSURE: 65 MMHG | WEIGHT: 233 LBS | BODY MASS INDEX: 34.41 KG/M2

## 2021-01-21 DIAGNOSIS — Z98.891 HISTORY OF C-SECTION: ICD-10-CM

## 2021-01-21 DIAGNOSIS — I10 ESSENTIAL HYPERTENSION: Primary | ICD-10-CM

## 2021-01-21 DIAGNOSIS — O09.299: ICD-10-CM

## 2021-01-21 DIAGNOSIS — O09.529 AMA (ADVANCED MATERNAL AGE) MULTIGRAVIDA 35+, UNSPECIFIED TRIMESTER: ICD-10-CM

## 2021-01-21 DIAGNOSIS — O10.019 PRE-EXISTING ESSENTIAL HYPERTENSION COMPLICATING PREGNANCY, UNSPECIFIED TRIMESTER: ICD-10-CM

## 2021-01-21 PROCEDURE — 76819 FETAL BIOPHYS PROFIL W/O NST: CPT

## 2021-01-21 PROCEDURE — 76819 FETAL BIOPHYS PROFIL W/O NST: CPT | Performed by: OBSTETRICS & GYNECOLOGY

## 2021-01-21 PROCEDURE — 76811 OB US DETAILED SNGL FETUS: CPT | Performed by: OBSTETRICS & GYNECOLOGY

## 2021-01-21 PROCEDURE — 76811 OB US DETAILED SNGL FETUS: CPT

## 2021-01-21 RX ORDER — ASPIRIN 81 MG/1
81 TABLET ORAL DAILY
Status: ON HOLD | COMMUNITY
End: 2021-03-17

## 2021-01-21 RX ORDER — ONDANSETRON HYDROCHLORIDE 8 MG/1
8 TABLET, FILM COATED ORAL AS NEEDED
COMMUNITY
Start: 2021-01-15 | End: 2021-03-15 | Stop reason: SDUPTHER

## 2021-01-21 RX ORDER — ALUMINUM ZIRCONIUM TRICHLOROHYDREX GLY 0.19 G/G
20 STICK TOPICAL DAILY
COMMUNITY
Start: 2020-11-23 | End: 2022-05-05

## 2021-01-21 RX ORDER — PRENATAL WITH FERROUS FUM AND FOLIC ACID 3080; 920; 120; 400; 22; 1.84; 3; 20; 10; 1; 12; 200; 27; 25; 2 [IU]/1; [IU]/1; MG/1; [IU]/1; MG/1; MG/1; MG/1; MG/1; MG/1; MG/1; UG/1; MG/1; MG/1; MG/1; MG/1
1 TABLET ORAL DAILY
COMMUNITY
End: 2022-05-05

## 2021-01-21 NOTE — PROGRESS NOTES
Patient seen in Maternal Fetal Medicine clinic today. Please see full note in under imaging tab of patient chart in Epic (Viewpoint report).    Susannah Guerra MD

## 2021-01-21 NOTE — PROGRESS NOTES
Pt denies lof, vag bleeding or cramping. Denies ha, blurred vision or epigastric pain. Sees OB 2/3.

## 2021-02-04 ENCOUNTER — INITIAL PRENATAL (OUTPATIENT)
Dept: OBSTETRICS AND GYNECOLOGY | Facility: CLINIC | Age: 36
End: 2021-02-04

## 2021-02-04 VITALS — WEIGHT: 230 LBS | SYSTOLIC BLOOD PRESSURE: 118 MMHG | BODY MASS INDEX: 33.97 KG/M2 | DIASTOLIC BLOOD PRESSURE: 70 MMHG

## 2021-02-04 DIAGNOSIS — Z98.891 PREVIOUS CESAREAN SECTION: Primary | ICD-10-CM

## 2021-02-04 DIAGNOSIS — O09.299 HISTORY OF HELLP SYNDROME, CURRENTLY PREGNANT: ICD-10-CM

## 2021-02-04 DIAGNOSIS — Z3A.32 32 WEEKS GESTATION OF PREGNANCY: ICD-10-CM

## 2021-02-04 DIAGNOSIS — I10 ESSENTIAL HYPERTENSION: ICD-10-CM

## 2021-02-04 PROBLEM — Z34.90 PREGNANCY: Status: ACTIVE | Noted: 2021-02-04

## 2021-02-04 PROCEDURE — 0501F PRENATAL FLOW SHEET: CPT | Performed by: OBSTETRICS & GYNECOLOGY

## 2021-02-04 RX ORDER — BREAST PUMP
EACH MISCELLANEOUS
Qty: 1 EACH | Refills: 0 | Status: SHIPPED | OUTPATIENT
Start: 2021-02-04 | End: 2022-05-05

## 2021-02-04 NOTE — PROGRESS NOTES
Initial ob visit     CC- Here for care of pregnancy        China Carbajal is a 35 y.o. female, , who presents for her first obstetrical visit.  Her last LMP was No LMP recorded (lmp unknown). Patient is pregnant..    OB History    Para Term  AB Living   2 1 0 1 0 1   SAB TAB Ectopic Molar Multiple Live Births   0 0 0 0 0 1      # Outcome Date GA Lbr Dexter/2nd Weight Sex Delivery Anes PTL Lv   2 Current            1  14 32w0d  1644 g (3 lb 10 oz) F CS-Unspec   STEPHANIE      Complications: HELLP syndrome          Prior obstetric issues, potential pregnancy concerns: Hellp syndrome with her last  Family history of genetic issues (includes FOB): none  Prior infections concerning in pregnancy (Rash, fever in last 2 weeks): rash on her wrist today  Varicella Hx - hx of chicken pox  Prior testing for Cystic Fibrosis Carrier or Sickle Cell Trait- never  Prepregnancy BMI - Body mass index is 33.97 kg/m².  History of STD: no  Ultrasound Today: No.    Additional Pertinent History   Last Pap : will get records  Last Completed Pap Smear       Status Date      PAP SMEAR No completions recorded        History of abnormal Pap smear: no  Family history of uterine, colon, breast, or ovarian cancer: yes - cousin and paternal grandmother with colon cancer  Feelings of Anxiety or Depression: no  Tobacco Usage?: No   Alcohol/Drug Use?: NO  Over the age of 35 at delivery: yes  Desires Genetic Screening: done and negative per patient report  Flu Status: Declines   Pt. Denies LOF, vaginal bleeding, HA's, vision changes, ctx. She reports her 1hr gtt was abnormal and her 3hr gtt was normal. Declines TDAP vaccine today. She reports her average BP has been 110s/70s.    PMH  Past Medical History:   Diagnosis Date   • Asthma    • Fatty liver 2018   • HELLP syndrome    • Hypertension    • Hypertension 2019       Current Outpatient Medications:   •  Ascorbic Acid (VITAMIN C ADULT GUMMIES PO), Take  by mouth Daily., Disp:  , Rfl:   •  aspirin 81 MG EC tablet, Take 81 mg by mouth Daily., Disp: , Rfl:   •  labetalol (NORMODYNE) 100 MG tablet, Take 1 tablet by mouth 2 (Two) Times a Day., Disp: 60 tablet, Rfl: 9  •  Misc. Devices (Breast Pump) misc, Use PRN, Disp: 1 each, Rfl: 0  •  ondansetron (ZOFRAN) 8 MG tablet, Take 8 mg by mouth As Needed., Disp: , Rfl:   •  Prenatal Vit-Fe Fumarate-FA (Prenatal 27-1) 27-1 MG tablet tablet, Take 1 tablet by mouth Daily., Disp: , Rfl:   •  PrilOSEC OTC 20 MG EC tablet, Take 20 mg by mouth Daily., Disp: , Rfl:     The additional following portions of the patient's history were reviewed and updated as appropriate: allergies, current medications, past family history, past medical history, past social history, past surgical history and problem list.    Review of Systems   Review of Systems  Current obstetric complaints : none.    All systems reviewed and otherwise normal.    I have reviewed and agree with the HPI, ROS, and historical information as entered above. Gloria Dee MD    /70   Wt 104 kg (230 lb)   LMP  (LMP Unknown)   BMI 33.97 kg/m²     Physical Exam  General:  well developed; well nourished  no acute distress   Chest/Respiratory: No labored breathing, normal respiratory effort, normal appearance, no respiratory noises noted   Heart:  normal rate, regular rhythm,  no murmurs, rubs, or gallops   Thyroid: normal to inspection and palpation   Breasts:  Not performed.   Abdomen: soft, non-tender; no masses  no umbilical or inguinal hernias are present  no hepato-splenomegaly   Pelvis: Not performed.        Assessment and Plan    Problem List Items Addressed This Visit        Other    Essential hypertension    Overview     Chronic HTN. Labetalol 100mg bid.  Will start twice weekly NSTs.  We will get growth ultrasound at 35 weeks.         Relevant Medications    labetalol (NORMODYNE) 100 MG tablet    Other Relevant Orders    Fetal Nonstress Test    Fetal Nonstress Test    History  of HELLP syndrome, currently pregnant    Overview     , delivered @ 32wks         Relevant Orders    Fetal Nonstress Test    Fetal Nonstress Test    Previous  section - Primary    Overview     3/2014 @ 32wks for HELLP syndrome. 3#10oz         Pregnancy    Relevant Orders    Fetal Nonstress Test          1. Pregnancy at 32w3d  Reviewed routine prenatal care with the office and educational materials given  Patient is on Prenatal vitamins  Follow Up: Return in about 4 days (around 2021) for nst.  Return in about 4 days (around 2021) for nst.      Gloria Dee MD  2021

## 2021-02-08 ENCOUNTER — ROUTINE PRENATAL (OUTPATIENT)
Dept: OBSTETRICS AND GYNECOLOGY | Facility: CLINIC | Age: 36
End: 2021-02-08

## 2021-02-08 VITALS — DIASTOLIC BLOOD PRESSURE: 70 MMHG | WEIGHT: 232.8 LBS | SYSTOLIC BLOOD PRESSURE: 118 MMHG | BODY MASS INDEX: 34.38 KG/M2

## 2021-02-08 DIAGNOSIS — O09.299 HISTORY OF HELLP SYNDROME, CURRENTLY PREGNANT: ICD-10-CM

## 2021-02-08 DIAGNOSIS — Z3A.33 33 WEEKS GESTATION OF PREGNANCY: Primary | ICD-10-CM

## 2021-02-08 DIAGNOSIS — Z98.891 PREVIOUS CESAREAN SECTION: ICD-10-CM

## 2021-02-08 DIAGNOSIS — O10.919 CHRONIC HYPERTENSION AFFECTING PREGNANCY: ICD-10-CM

## 2021-02-08 DIAGNOSIS — I10 ESSENTIAL HYPERTENSION: ICD-10-CM

## 2021-02-08 LAB
EXPIRATION DATE: NORMAL
GLUCOSE UR STRIP-MCNC: NEGATIVE MG/DL
Lab: NORMAL
PROT UR STRIP-MCNC: NEGATIVE MG/DL

## 2021-02-08 PROCEDURE — 0502F SUBSEQUENT PRENATAL CARE: CPT | Performed by: NURSE PRACTITIONER

## 2021-02-08 NOTE — PROGRESS NOTES
OB FOLLOW UP  CC- Here for care of pregnancy        China Carbajal is a 35 y.o.  33w0d patient being seen today for her obstetrical follow up visit. Patient reports swelling. Pt states BP controlled at home, on labetalol.     She reports swelling is moderate and is occurring in her hands, feet and ankles.    She denies LOF, vaginal spotting, headaches, vision changes, morgan elder or contractions. She reports good fetal movements, >10 movements in 10 hours.     Her prenatal care is complicated by (and status) :    Patient Active Problem List   Diagnosis   • Palpitations   • Essential hypertension   • Bilateral leg edema   • History of HELLP syndrome, currently pregnant   • Previous  section   • Pregnancy       Flu Status: Declines  Ultrasound Today: No.  Non Stress Test: Yes minutes: 45 min   non-stress test: NST: Non-Reactive  indication: Hypertension      ROS -   Patient Reports : swelling  Patient Denies: Loss of Fluid, Vaginal Spotting, Vision Changes, Headaches, Nausea , Vomiting , Contractions and Epigastric pain  Fetal Movement : >10 movements in 10 hours  All other systems reviewed and are negative.       The additional following portions of the patient's history were reviewed and updated as appropriate: allergies, current medications, past family history, past medical history, past social history, past surgical history and problem list.    I have reviewed and agree with the HPI, ROS, and historical information as entered above. Barbara Giraldo, OMKAR    /70   Wt 106 kg (232 lb 12.8 oz)   LMP  (LMP Unknown)   BMI 34.38 kg/m²       EXAM:     FHT: 143 BPM   Pelvic Exam: No    Urine glucose/protein: See prenatal flowsheet       Assessment and Plan    Problem List Items Addressed This Visit        Other    Essential hypertension    Overview     Chronic HTN. Labetalol 100mg bid.  Will start twice weekly NSTs.  We will get growth ultrasound at 35 weeks.         Relevant Medications    labetalol  (NORMODYNE) 100 MG tablet    History of HELLP syndrome, currently pregnant    Overview     , delivered @ 32wks         Previous  section    Overview     3/2014 @ 32wks for HELLP syndrome. 3#10oz         Pregnancy - Primary    Relevant Orders    Fetal Nonstress Test    POC Glucose, Urine, Qualitative, Dipstick (Completed)    POC Protein, Urine, Qualitative, Dipstick (Completed)      Other Visit Diagnoses     Chronic hypertension affecting pregnancy        Relevant Orders    US Fetal Biophysical Profile;With Non-Stress Testing          1. Pregnancy at 33w0d  2. Fetal status reassuring. NST non-reactive, BPP 8/8.   3. BP controlled, on labetalol 100 mg bid. Continue biweekly NSTs.   Return in about 4 days (around 2021) for with NST.     Barbara Giraldo, OMKAR  2021

## 2021-02-19 ENCOUNTER — ROUTINE PRENATAL (OUTPATIENT)
Dept: OBSTETRICS AND GYNECOLOGY | Facility: CLINIC | Age: 36
End: 2021-02-19

## 2021-02-19 VITALS — SYSTOLIC BLOOD PRESSURE: 120 MMHG | BODY MASS INDEX: 34.85 KG/M2 | DIASTOLIC BLOOD PRESSURE: 78 MMHG | WEIGHT: 236 LBS

## 2021-02-19 DIAGNOSIS — I10 ESSENTIAL HYPERTENSION: ICD-10-CM

## 2021-02-19 DIAGNOSIS — Z3A.34 34 WEEKS GESTATION OF PREGNANCY: Primary | ICD-10-CM

## 2021-02-19 DIAGNOSIS — O09.299 HISTORY OF HELLP SYNDROME, CURRENTLY PREGNANT: ICD-10-CM

## 2021-02-19 LAB
GLUCOSE UR STRIP-MCNC: NEGATIVE MG/DL
PROT UR STRIP-MCNC: NEGATIVE MG/DL

## 2021-02-19 PROCEDURE — 59025 FETAL NON-STRESS TEST: CPT | Performed by: NURSE PRACTITIONER

## 2021-02-19 PROCEDURE — 90715 TDAP VACCINE 7 YRS/> IM: CPT | Performed by: NURSE PRACTITIONER

## 2021-02-19 PROCEDURE — 0502F SUBSEQUENT PRENATAL CARE: CPT | Performed by: NURSE PRACTITIONER

## 2021-02-19 PROCEDURE — 90471 IMMUNIZATION ADMIN: CPT | Performed by: NURSE PRACTITIONER

## 2021-02-19 NOTE — PROGRESS NOTES
OB FOLLOW UP  CC- Here for care of pregnancy        China Carbajal is a 35 y.o.  34w4d patient being seen today for her obstetrical follow up visit. Patient reports no complaints.     Her prenatal care is complicated by (and status) :    Patient Active Problem List   Diagnosis   • Palpitations   • Essential hypertension   • Bilateral leg edema   • History of HELLP syndrome, currently pregnant   • Previous  section   • Pregnancy       Flu Status: Declines  Ultrasound Today: No.    ROS -   Patient Reports : No Problems  Patient Denies: Loss of Fluid, Vaginal Spotting, Vision Changes, Headaches, Nausea , Vomiting , Contractions and Epigastric pain  Fetal Movement : normal  All other systems reviewed and are negative.       The additional following portions of the patient's history were reviewed and updated as appropriate: allergies, current medications, past family history, past medical history, past social history, past surgical history and problem list.    I have reviewed and agree with the HPI, ROS, and historical information as entered above. Angelica Peace, APRN    /78   Wt 107 kg (236 lb)   LMP  (LMP Unknown)   BMI 34.85 kg/m²       EXAM:     FHT: pos BPM   Pelvic Exam: No    Urine glucose/protein: See prenatal flowsheet       Assessment and Plan    Problem List Items Addressed This Visit        Cardiac and Vasculature    Essential hypertension    Overview     Chronic HTN. Labetalol 100mg bid.  Will start twice weekly NSTs.  We will get growth ultrasound at 35 weeks.         Relevant Medications    labetalol (NORMODYNE) 100 MG tablet    Other Relevant Orders    US Fetal Biophysical Profile Without Non Stress Testing    US OB Follow Up Transabdominal Approach    Preeclampsia Panel    CBC (No Diff)       Gravid and     History of HELLP syndrome, currently pregnant    Overview     , delivered @ 32wks  PEP labs 21         Relevant Orders    US Fetal Biophysical Profile  Without Non Stress Testing    US OB Follow Up Transabdominal Approach    Preeclampsia Panel    CBC (No Diff)    Pregnancy - Primary    Relevant Orders    POC Urinalysis Dipstick (Completed)    US Fetal Biophysical Profile Without Non Stress Testing    US OB Follow Up Transabdominal Approach          1. Pregnancy at 34w4d  2. Fetal status reassuring.   3. Activity and Exercise discussed.  Return in 4 days (on 2/23/2021) for JAKY Sauceda. U/S for growth/BPP at next Friday visit.   Non Stress Test: minutes: 20  non-stress test: NST: Reactive  indication: Hypertension  category: Category I   Preeclampsia precautions and kick counts reviewed.      Angelica Peace, APRN  02/19/2021

## 2021-02-22 LAB
ALT SERPL-CCNC: 16 IU/L (ref 0–32)
AST SERPL-CCNC: 21 IU/L (ref 0–40)
BASOPHILS # BLD AUTO: 0 X10E3/UL (ref 0–0.2)
BASOPHILS NFR BLD AUTO: 0 %
BUN SERPL-MCNC: 8 MG/DL (ref 6–20)
CREAT SERPL-MCNC: 0.49 MG/DL (ref 0.57–1)
CREAT UR-MCNC: ABNORMAL MG/DL
EOSINOPHIL # BLD AUTO: 0 X10E3/UL (ref 0–0.4)
EOSINOPHIL NFR BLD AUTO: 0 %
ERYTHROCYTE [DISTWIDTH] IN BLOOD BY AUTOMATED COUNT: 12.9 % (ref 11.7–15.4)
HCT VFR BLD AUTO: 39.3 % (ref 34–46.6)
HGB BLD-MCNC: 13.4 G/DL (ref 11.1–15.9)
IMM GRANULOCYTES # BLD AUTO: 0 X10E3/UL (ref 0–0.1)
IMM GRANULOCYTES NFR BLD AUTO: 0 %
LDH SERPL-CCNC: 146 IU/L (ref 119–226)
LYMPHOCYTES # BLD AUTO: 2.2 X10E3/UL (ref 0.7–3.1)
LYMPHOCYTES NFR BLD AUTO: 24 %
MCH RBC QN AUTO: 33 PG (ref 26.6–33)
MCHC RBC AUTO-ENTMCNC: 34.1 G/DL (ref 31.5–35.7)
MCV RBC AUTO: 97 FL (ref 79–97)
MICROALBUMIN UR-MCNC: ABNORMAL
MONOCYTES # BLD AUTO: 0.7 X10E3/UL (ref 0.1–0.9)
MONOCYTES NFR BLD AUTO: 7 %
NEUTROPHILS # BLD AUTO: 6.1 X10E3/UL (ref 1.4–7)
NEUTROPHILS NFR BLD AUTO: 69 %
PLATELET # BLD AUTO: 205 X10E3/UL (ref 150–450)
RBC # BLD AUTO: 4.06 X10E6/UL (ref 3.77–5.28)
UNABLE TO VOID: NORMAL
URATE SERPL-MCNC: 4 MG/DL (ref 2.6–6.2)
WBC # BLD AUTO: 9 X10E3/UL (ref 3.4–10.8)

## 2021-02-26 ENCOUNTER — ROUTINE PRENATAL (OUTPATIENT)
Dept: OBSTETRICS AND GYNECOLOGY | Facility: CLINIC | Age: 36
End: 2021-02-26

## 2021-02-26 VITALS — WEIGHT: 237 LBS | SYSTOLIC BLOOD PRESSURE: 128 MMHG | DIASTOLIC BLOOD PRESSURE: 74 MMHG | BODY MASS INDEX: 35 KG/M2

## 2021-02-26 DIAGNOSIS — O09.299 HISTORY OF HELLP SYNDROME, CURRENTLY PREGNANT: ICD-10-CM

## 2021-02-26 DIAGNOSIS — Z3A.34 34 WEEKS GESTATION OF PREGNANCY: Primary | ICD-10-CM

## 2021-02-26 DIAGNOSIS — Z98.891 PREVIOUS CESAREAN SECTION: ICD-10-CM

## 2021-02-26 DIAGNOSIS — I10 ESSENTIAL HYPERTENSION: ICD-10-CM

## 2021-02-26 LAB
GLUCOSE UR STRIP-MCNC: NEGATIVE MG/DL
PROT UR STRIP-MCNC: ABNORMAL MG/DL

## 2021-02-26 PROCEDURE — 76816 OB US FOLLOW-UP PER FETUS: CPT | Performed by: OBSTETRICS & GYNECOLOGY

## 2021-02-26 PROCEDURE — 76819 FETAL BIOPHYS PROFIL W/O NST: CPT | Performed by: OBSTETRICS & GYNECOLOGY

## 2021-02-26 PROCEDURE — 0502F SUBSEQUENT PRENATAL CARE: CPT | Performed by: NURSE PRACTITIONER

## 2021-02-26 NOTE — PROGRESS NOTES
OB FOLLOW UP    China Carbajal is a 35 y.o.  35w4d patient being seen today for her obstetrical follow up visit. Patient reports fatigue.     Her prenatal care is complicated by (and status) :    Patient Active Problem List   Diagnosis   • Palpitations   • Essential hypertension   • Bilateral leg edema   • History of HELLP syndrome, currently pregnant   • Previous  section   • Pregnancy       ROS -   Patient Reports : fatigue  Patient Denies: Loss of Fluid, Vaginal Spotting, Vision Changes, Headaches and Cramping/Contractions   Fetal Movement : normal      /74   Wt 108 kg (237 lb)   LMP  (LMP Unknown)   BMI 35.00 kg/m²     Ultrasound Today: yes    EXAM:  Vitals: See prenatal flowsheet       Urine glucose/protein: See prenatal flowsheet         Assessment    1. Pregnancy at 35w4d  2. Fetal status reassuring   3. CHTN    Problem List Items Addressed This Visit        Cardiac and Vasculature    Essential hypertension    Overview     Chronic HTN. Labetalol 100mg bid.  Will start twice weekly NSTs.  We will get growth ultrasound at 35 weeks.         Relevant Medications    labetalol (NORMODYNE) 100 MG tablet    Other Relevant Orders    Preeclampsia Panel    Protein, Urine, 24 Hour - Urine, Clean Catch    CBC (No Diff)       Gravid and     History of HELLP syndrome, currently pregnant    Overview     , delivered @ 32wks  PEP labs 21         Relevant Orders    Preeclampsia Panel    Protein, Urine, 24 Hour - Urine, Clean Catch    CBC (No Diff)    Previous  section    Overview     3/2014 @ 32wks for HELLP syndrome. 3#10oz         Pregnancy - Primary    Relevant Orders    POC Urinalysis Dipstick (Completed)    Preeclampsia Panel    Protein, Urine, 24 Hour - Urine, Clean Catch    CBC (No Diff)          Plan    1. Fetal movement/Labor Precautions  2. CHTN, on labetalol. BP stable, BPP 8/8. +Proteinuria today, will get 24 hr urine protein and check labs. PIH precautions reviewed.    3. Follow up: 4 day(s) with NST with [place].       Barbara Giraldo, OMKAR  02/26/2021

## 2021-03-01 LAB
ALT SERPL-CCNC: 16 IU/L (ref 0–32)
AST SERPL-CCNC: 19 IU/L (ref 0–40)
BASOPHILS # BLD AUTO: 0 X10E3/UL (ref 0–0.2)
BASOPHILS NFR BLD AUTO: 0 %
BUN SERPL-MCNC: 7 MG/DL (ref 6–20)
CREAT SERPL-MCNC: 0.5 MG/DL (ref 0.57–1)
CREAT UR-MCNC: ABNORMAL MG/DL
EOSINOPHIL # BLD AUTO: 0 X10E3/UL (ref 0–0.4)
EOSINOPHIL NFR BLD AUTO: 0 %
ERYTHROCYTE [DISTWIDTH] IN BLOOD BY AUTOMATED COUNT: 12.8 % (ref 11.7–15.4)
HCT VFR BLD AUTO: 41.6 % (ref 34–46.6)
HGB BLD-MCNC: 14.3 G/DL (ref 11.1–15.9)
IMM GRANULOCYTES # BLD AUTO: 0 X10E3/UL (ref 0–0.1)
IMM GRANULOCYTES NFR BLD AUTO: 0 %
LDH SERPL-CCNC: 167 IU/L (ref 119–226)
LYMPHOCYTES # BLD AUTO: 2.4 X10E3/UL (ref 0.7–3.1)
LYMPHOCYTES NFR BLD AUTO: 24 %
MCH RBC QN AUTO: 33.2 PG (ref 26.6–33)
MCHC RBC AUTO-ENTMCNC: 34.4 G/DL (ref 31.5–35.7)
MCV RBC AUTO: 97 FL (ref 79–97)
MICROALBUMIN UR-MCNC: ABNORMAL
MONOCYTES # BLD AUTO: 0.5 X10E3/UL (ref 0.1–0.9)
MONOCYTES NFR BLD AUTO: 5 %
NEUTROPHILS # BLD AUTO: 6.8 X10E3/UL (ref 1.4–7)
NEUTROPHILS NFR BLD AUTO: 71 %
PLATELET # BLD AUTO: 184 X10E3/UL (ref 150–450)
RBC # BLD AUTO: 4.31 X10E6/UL (ref 3.77–5.28)
UNABLE TO VOID: NORMAL ML/MIN/1.73
URATE SERPL-MCNC: 3.9 MG/DL (ref 2.6–6.2)
WBC # BLD AUTO: 9.8 X10E3/UL (ref 3.4–10.8)

## 2021-03-02 ENCOUNTER — ROUTINE PRENATAL (OUTPATIENT)
Dept: OBSTETRICS AND GYNECOLOGY | Facility: CLINIC | Age: 36
End: 2021-03-02

## 2021-03-02 ENCOUNTER — LAB (OUTPATIENT)
Dept: LAB | Facility: HOSPITAL | Age: 36
End: 2021-03-02

## 2021-03-02 VITALS — BODY MASS INDEX: 35.41 KG/M2 | SYSTOLIC BLOOD PRESSURE: 122 MMHG | WEIGHT: 239.8 LBS | DIASTOLIC BLOOD PRESSURE: 82 MMHG

## 2021-03-02 DIAGNOSIS — Z34.90 PREGNANCY, UNSPECIFIED GESTATIONAL AGE: ICD-10-CM

## 2021-03-02 DIAGNOSIS — Z98.891 PREVIOUS CESAREAN SECTION: ICD-10-CM

## 2021-03-02 DIAGNOSIS — O09.299 HISTORY OF HELLP SYNDROME, CURRENTLY PREGNANT: ICD-10-CM

## 2021-03-02 DIAGNOSIS — Z3A.36 36 WEEKS GESTATION OF PREGNANCY: ICD-10-CM

## 2021-03-02 DIAGNOSIS — O10.919 CHRONIC HYPERTENSION IN PREGNANCY: Primary | ICD-10-CM

## 2021-03-02 DIAGNOSIS — Z3A.36 36 WEEKS GESTATION OF PREGNANCY: Primary | ICD-10-CM

## 2021-03-02 LAB
EXPIRATION DATE: 0
GLUCOSE UR STRIP-MCNC: NEGATIVE MG/DL
Lab: 0
PROT UR STRIP-MCNC: NEGATIVE MG/DL

## 2021-03-02 PROCEDURE — 87081 CULTURE SCREEN ONLY: CPT

## 2021-03-02 PROCEDURE — 0502F SUBSEQUENT PRENATAL CARE: CPT | Performed by: OBSTETRICS & GYNECOLOGY

## 2021-03-02 NOTE — PROGRESS NOTES
OB FOLLOW UP  CC- Here for care of pregnancy        China Carbajal is a 35 y.o.  36w1d patient being seen today for her obstetrical follow up visit. Patient reports severe acid reflux (Prilosec helping), leg cramps, soreness at RUQ, bilateral upper/lower extremity edema, and headaches with elevated B/Ps (denies changed vision). Home B/Ps slightly elevated Saturday, 150s/80s;  evening 160s/90s, Monday night 160s/90s. 24-hr urine turned in today. U/S today.    Her prenatal care is complicated by (and status) :    Patient Active Problem List   Diagnosis   • Palpitations   • Chronic hypertension in pregnancy   • Bilateral leg edema   • History of HELLP syndrome, currently pregnant   • Previous  section   • Pregnancy         Flu Status: Declines  GBS Status: Done Today  Her Delivery Plan is: Repeat . Needs to be scheduled  Ultrasound Today: Yes.  Findings showed BPP 8/8.  I have personally evaluated the U/S and agree with the findings. Gloria Dee MD    ROS -   Patient Reports : Headaches and acid reflux  Patient Denies: Loss of Fluid, Vaginal Spotting, Vision Changes, Nausea , Vomiting  and Contractions  Fetal Movement : normal  All other systems reviewed and are negative.       The additional following portions of the patient's history were reviewed and updated as appropriate: allergies, current medications, past family history, past medical history, past social history, past surgical history and problem list.    I have reviewed and agree with the HPI, ROS, and historical information as entered above. Gloria Dee MD        /82   Wt 109 kg (239 lb 12.8 oz)   LMP  (LMP Unknown)   BMI 35.41 kg/m²     EXAM:     FHT: 153 BPM   Uterine Size: size equals dates  Pelvic Exam: Yes.  Presentation: cephalic. Dilation: Closed. Effacement: 50%. Station: -2.    Urine glucose/protein: See prenatal flowsheet       Assessment and Plan    Problem List Items Addressed This Visit         Gravid and     Chronic hypertension in pregnancy - Primary    Overview     Chronic HTN. Labetalol 100mg bid.  Will start twice weekly NSTs at 32 weeks.  Growth ultrasound at 35 weeks shows 6 pounds which is 50th percentile.         Relevant Medications    labetalol (NORMODYNE) 100 MG tablet    Other Relevant Orders    US Fetal Biophysical Profile;With Non-Stress Testing    US Ob Follow Up Transabdominal Approach    History of HELLP syndrome, currently pregnant    Overview     , delivered @ 32wks  PEP labs 21         Previous  section    Overview     3/2014 @ 32wks for HELLP syndrome. 3#10oz         Pregnancy    Relevant Orders    Group B Streptococcus Culture - Swab, Vagina    POC Glucose, Urine, Qualitative, Dipstick (Completed)    POC Protein, Urine, Qualitative, Dipstick (Completed)      Will recheck blood pressure on Friday with an NST.  Discussed  at that time.    1. Pregnancy at 36w1d  2. Fetal status reassuring.   3. Activity and Exercise discussed.  Return in about 3 days (around 3/5/2021) for nst.    Gloria Dee MD  2021

## 2021-03-03 LAB
PROT 24H UR-MRATE: 198 MG/24 HR (ref 30–150)
PROT UR-MCNC: 15.2 MG/DL

## 2021-03-05 ENCOUNTER — ROUTINE PRENATAL (OUTPATIENT)
Dept: OBSTETRICS AND GYNECOLOGY | Facility: CLINIC | Age: 36
End: 2021-03-05

## 2021-03-05 VITALS — BODY MASS INDEX: 35.44 KG/M2 | DIASTOLIC BLOOD PRESSURE: 70 MMHG | WEIGHT: 240 LBS | SYSTOLIC BLOOD PRESSURE: 122 MMHG

## 2021-03-05 DIAGNOSIS — Z98.891 PREVIOUS CESAREAN SECTION: ICD-10-CM

## 2021-03-05 DIAGNOSIS — O10.919 CHRONIC HYPERTENSION IN PREGNANCY: ICD-10-CM

## 2021-03-05 DIAGNOSIS — Z3A.36 36 WEEKS GESTATION OF PREGNANCY: Primary | ICD-10-CM

## 2021-03-05 DIAGNOSIS — O09.299 HISTORY OF HELLP SYNDROME, CURRENTLY PREGNANT: ICD-10-CM

## 2021-03-05 LAB
BACTERIA SPEC AEROBE CULT: NORMAL
GLUCOSE UR STRIP-MCNC: NEGATIVE MG/DL
PROT UR STRIP-MCNC: NEGATIVE MG/DL

## 2021-03-05 PROCEDURE — 59025 FETAL NON-STRESS TEST: CPT | Performed by: OBSTETRICS & GYNECOLOGY

## 2021-03-05 PROCEDURE — 0502F SUBSEQUENT PRENATAL CARE: CPT | Performed by: OBSTETRICS & GYNECOLOGY

## 2021-03-05 NOTE — PROGRESS NOTES
OB FOLLOW UP  CC- Here for care of pregnancy        China Carbajal is a 35 y.o.  36w4d patient being seen today for her obstetrical follow up visit. Patient reports no complaints.  She reports blood pressures better controlled.  She denies headaches or vision changes.    Her prenatal care is complicated by (and status) :    Patient Active Problem List   Diagnosis   • Palpitations   • Chronic hypertension in pregnancy   • Bilateral leg edema   • History of HELLP syndrome, currently pregnant   • Previous  section   • Pregnancy         Flu Status: Declines  GBS Status: Was already done and it was negative.   Her Delivery Plan is: Repeat . Scheduled   Ultrasound Today: No.    Fetal Non-Stress Test  Patient: China Carbajal  : 1985  MRN: [id number]  CSN: [id number]  Date: 2021    Estimated Date of Delivery: 3/29/21  Gestational Age: 36w4d    Indication for NST pregnancy-induced hypertension       Time On 1:50 pm   Time Off 2:10 pm       Interpretation    Baseline  beats per minute   Category 1   Decelerations Absent       Additional Comments  reactive NST       Recommendations for f/u  follow-up for twice weekly NST secondary to chronic hypertension       Gloria Dee MD        ROS -   Patient Reports : No Problems  Patient Denies: Loss of Fluid, Vaginal Spotting, Vision Changes, Headaches and Contractions  Fetal Movement : normal  All other systems reviewed and are negative.       The additional following portions of the patient's history were reviewed and updated as appropriate: allergies, current medications, past family history, past medical history, past social history, past surgical history and problem list.    I have reviewed and agree with the HPI, ROS, and historical information as entered above. Gloria Dee MD        /70   Wt 109 kg (240 lb)   LMP  (LMP Unknown)   BMI 35.44 kg/m²     EXAM:     FHT: 140 BPM   Uterine Size: size equals  dates  Pelvic Exam: No    Urine glucose/protein: See prenatal flowsheet       Assessment and Plan    Problem List Items Addressed This Visit        Gravid and     Chronic hypertension in pregnancy    Overview     Chronic HTN. Labetalol 100mg bid.  Will start twice weekly NSTs at 32 weeks.  Growth ultrasound at 35 weeks shows 6 pounds which is 50th percentile.         Relevant Medications    labetalol (NORMODYNE) 100 MG tablet    History of HELLP syndrome, currently pregnant    Overview     , delivered @ 32wks  PEP labs 21         Previous  section    Overview     3/2014 @ 32wks for HELLP syndrome. 3#10oz         Pregnancy - Primary    Relevant Orders    POC Urinalysis Dipstick (Completed)        Recommend patient off work from now until 8 weeks after delivery.  1. Pregnancy at 36w4d  2. Fetal status reassuring.   3. Activity and Exercise discussed.  Return in about 3 days (around 3/8/2021) for nst.    Gloria Dee MD  2021

## 2021-03-09 ENCOUNTER — ROUTINE PRENATAL (OUTPATIENT)
Dept: OBSTETRICS AND GYNECOLOGY | Facility: CLINIC | Age: 36
End: 2021-03-09

## 2021-03-09 VITALS — SYSTOLIC BLOOD PRESSURE: 130 MMHG | BODY MASS INDEX: 35.88 KG/M2 | DIASTOLIC BLOOD PRESSURE: 78 MMHG | WEIGHT: 243 LBS

## 2021-03-09 DIAGNOSIS — Z3A.37 37 WEEKS GESTATION OF PREGNANCY: ICD-10-CM

## 2021-03-09 DIAGNOSIS — O09.299 HISTORY OF HELLP SYNDROME, CURRENTLY PREGNANT: ICD-10-CM

## 2021-03-09 DIAGNOSIS — Z98.891 PREVIOUS CESAREAN SECTION: ICD-10-CM

## 2021-03-09 DIAGNOSIS — O10.919 CHRONIC HYPERTENSION IN PREGNANCY: Primary | ICD-10-CM

## 2021-03-09 LAB
GLUCOSE UR STRIP-MCNC: NEGATIVE MG/DL
PROT UR STRIP-MCNC: ABNORMAL MG/DL

## 2021-03-09 PROCEDURE — 0502F SUBSEQUENT PRENATAL CARE: CPT | Performed by: OBSTETRICS & GYNECOLOGY

## 2021-03-09 PROCEDURE — 59025 FETAL NON-STRESS TEST: CPT | Performed by: OBSTETRICS & GYNECOLOGY

## 2021-03-09 NOTE — PROGRESS NOTES
OB FOLLOW UP  CC- Here for care of pregnancy        China Carbajal is a 35 y.o.  37w1d patient being seen today for her obstetrical follow up visit. Patient reports no complaints. Pt. States average BP at home has been 130s-140s/70-80s    Her prenatal care is complicated by (and status) :    Patient Active Problem List   Diagnosis   • Palpitations   • Chronic hypertension in pregnancy   • Bilateral leg edema   • History of HELLP syndrome, currently pregnant   • Previous  section   • Pregnancy     Fetal Non-Stress Test  Patient: China Carbajal  : 1985  MRN: [id number]  CSN: [id number]  Date: 2021    Estimated Date of Delivery: 3/29/21  Gestational Age: 37w1d    Indication for NST HTN       Time On 1008 am   Time Off 1035 am       Interpretation    Baseline FHR 145beats per minute   Category 1   Decelerations Absent       Additional Comments  reactive NST       Recommendations for f/u  follow-up NST on Friday       Gloria Dee MD        GBS Status: Was already done and it was negative.   Her Delivery Plan is: Repeat . Scheduled  Ultrasound Today: No.    ROS -   Patient Reports : No Problems  Patient Denies: Loss of Fluid, Vaginal Spotting, Vision Changes, Headaches and Contractions  Fetal Movement : normal  All other systems reviewed and are negative.       The additional following portions of the patient's history were reviewed and updated as appropriate: allergies, current medications, past family history, past medical history, past social history, past surgical history and problem list.    I have reviewed and agree with the HPI, ROS, and historical information as entered above. Gloria Dee MD        /78   Wt 110 kg (243 lb)   LMP  (LMP Unknown)   BMI 35.88 kg/m²     EXAM:     FHT: 140 BPM   Uterine Size: size equals dates  Pelvic Exam: No    Urine glucose/protein: See prenatal flowsheet       Assessment and Plan    Problem List Items Addressed This Visit         Gravid and     Chronic hypertension in pregnancy - Primary    Overview     Chronic HTN. Labetalol 100mg bid.  Will start twice weekly NSTs at 32 weeks.  Growth ultrasound at 35 weeks shows 6 pounds which is 50th percentile.         Relevant Medications    labetalol (NORMODYNE) 100 MG tablet    History of HELLP syndrome, currently pregnant    Overview     , delivered @ 32wks  PEP labs 21         Relevant Orders    POC Urinalysis Dipstick (Completed)    Previous  section    Overview     3/2014 @ 32wks for HELLP syndrome. 3#10oz         Pregnancy    Relevant Orders    POC Urinalysis Dipstick (Completed)          1. Pregnancy at 37w1d  2. Fetal status reassuring.   3. Activity and Exercise discussed.  Return in about 3 days (around 3/12/2021) for nst with NP.    Gloria Dee MD  2021

## 2021-03-12 ENCOUNTER — ROUTINE PRENATAL (OUTPATIENT)
Dept: OBSTETRICS AND GYNECOLOGY | Facility: CLINIC | Age: 36
End: 2021-03-12

## 2021-03-12 VITALS — SYSTOLIC BLOOD PRESSURE: 124 MMHG | BODY MASS INDEX: 35.97 KG/M2 | DIASTOLIC BLOOD PRESSURE: 84 MMHG | WEIGHT: 243.6 LBS

## 2021-03-12 DIAGNOSIS — O09.299 HISTORY OF HELLP SYNDROME, CURRENTLY PREGNANT: ICD-10-CM

## 2021-03-12 DIAGNOSIS — O09.513 PRIMIGRAVIDA OF ADVANCED MATERNAL AGE IN THIRD TRIMESTER: ICD-10-CM

## 2021-03-12 DIAGNOSIS — Z34.90 PREGNANCY, UNSPECIFIED GESTATIONAL AGE: Primary | ICD-10-CM

## 2021-03-12 LAB
EXPIRATION DATE: 0
GLUCOSE UR STRIP-MCNC: NEGATIVE MG/DL
Lab: 0
PROT UR STRIP-MCNC: NEGATIVE MG/DL

## 2021-03-12 PROCEDURE — 0502F SUBSEQUENT PRENATAL CARE: CPT | Performed by: NURSE PRACTITIONER

## 2021-03-12 PROCEDURE — 59025 FETAL NON-STRESS TEST: CPT | Performed by: NURSE PRACTITIONER

## 2021-03-12 NOTE — PROGRESS NOTES
OB FOLLOW UP  CC- Here for care of pregnancy        China Carbajal is a 35 y.o.  37w4d patient being seen today for her obstetrical follow up visit. Patient reports no bleeding, no contractions, no cramping, no leaking and lower extremity edema.     NST started at 10:08.    Her prenatal care is complicated by (and status) :    Patient Active Problem List   Diagnosis   • Palpitations   • Chronic hypertension in pregnancy   • Bilateral leg edema   • History of HELLP syndrome, currently pregnant   • Previous  section   • Pregnancy         Flu Status: Declines  GBS Status: Was already done and it was negative.   Her Delivery Plan is: Repeat . Scheduled for 2021 @ 13:30  Ultrasound Today: No.    ROS -   Patient Reports : No Problems  Patient Denies: Loss of Fluid, Vaginal Spotting, Vision Changes, Headaches, Nausea , Vomiting , Contractions and Epigastric pain  Fetal Movement : normal  All other systems reviewed and are negative.       The additional following portions of the patient's history were reviewed and updated as appropriate: allergies, current medications, past family history, past medical history, past social history, past surgical history and problem list.    I have reviewed and agree with the HPI, ROS, and historical information as entered above. Missy Monson Vinay, APRN        /84   Wt 110 kg (243 lb 9.6 oz)   LMP  (LMP Unknown)   BMI 35.97 kg/m²     EXAM:     FHT: wnl  Uterine Size: deferred  Pelvic Exam: deferred    Urine glucose/protein: See prenatal flowsheet       Assessment and Plan    Problem List Items Addressed This Visit        Gravid and     History of HELLP syndrome, currently pregnant    Overview     , delivered @ 32wks  PEP labs 21         Pregnancy - Primary    Relevant Orders    Fetal Nonstress Test    POC Glucose, Urine, Qualitative, Dipstick (Completed)    POC Protein, Urine, Qualitative, Dipstick (Completed)      Other Visit Diagnoses      Primigravida of advanced maternal age in third trimester        Relevant Orders    Fetal Nonstress Test          1. Pregnancy at 37w4d  2. NST reactive.  3. Activity and Exercise discussed.  Rev daily [facility], labor prec, preecl prec.  Return for Next scheduled follow up. on Monday, followed by ARMANDO Mclean, APRN  03/12/2021

## 2021-03-15 ENCOUNTER — APPOINTMENT (OUTPATIENT)
Dept: PREADMISSION TESTING | Facility: HOSPITAL | Age: 36
End: 2021-03-15

## 2021-03-15 ENCOUNTER — ROUTINE PRENATAL (OUTPATIENT)
Dept: OBSTETRICS AND GYNECOLOGY | Facility: CLINIC | Age: 36
End: 2021-03-15

## 2021-03-15 ENCOUNTER — PREP FOR SURGERY (OUTPATIENT)
Dept: OTHER | Facility: HOSPITAL | Age: 36
End: 2021-03-15

## 2021-03-15 VITALS — TEMPERATURE: 98 F | BODY MASS INDEX: 38.06 KG/M2 | HEIGHT: 67 IN | WEIGHT: 242.51 LBS

## 2021-03-15 VITALS — SYSTOLIC BLOOD PRESSURE: 130 MMHG | DIASTOLIC BLOOD PRESSURE: 84 MMHG | BODY MASS INDEX: 36.12 KG/M2 | WEIGHT: 244.6 LBS

## 2021-03-15 DIAGNOSIS — Z3A.38 38 WEEKS GESTATION OF PREGNANCY: ICD-10-CM

## 2021-03-15 DIAGNOSIS — Z98.891 PREVIOUS CESAREAN SECTION: ICD-10-CM

## 2021-03-15 DIAGNOSIS — O10.919 CHRONIC HYPERTENSION IN PREGNANCY: Primary | ICD-10-CM

## 2021-03-15 DIAGNOSIS — O10.919 CHRONIC HYPERTENSION IN PREGNANCY: ICD-10-CM

## 2021-03-15 DIAGNOSIS — O09.299 HISTORY OF HELLP SYNDROME, CURRENTLY PREGNANT: Primary | ICD-10-CM

## 2021-03-15 LAB
ABO GROUP BLD: NORMAL
BLD GP AB SCN SERPL QL: NEGATIVE
DEPRECATED RDW RBC AUTO: 47.1 FL (ref 37–54)
ERYTHROCYTE [DISTWIDTH] IN BLOOD BY AUTOMATED COUNT: 13.2 % (ref 12.3–15.4)
GLUCOSE UR STRIP-MCNC: NEGATIVE MG/DL
HCT VFR BLD AUTO: 41.5 % (ref 34–46.6)
HGB BLD-MCNC: 13.6 G/DL (ref 12–15.9)
MCH RBC QN AUTO: 31.9 PG (ref 26.6–33)
MCHC RBC AUTO-ENTMCNC: 32.8 G/DL (ref 31.5–35.7)
MCV RBC AUTO: 97.2 FL (ref 79–97)
PLATELET # BLD AUTO: 194 10*3/MM3 (ref 140–450)
PMV BLD AUTO: 11.7 FL (ref 6–12)
PROT UR STRIP-MCNC: ABNORMAL MG/DL
RBC # BLD AUTO: 4.27 10*6/MM3 (ref 3.77–5.28)
RH BLD: POSITIVE
SARS-COV-2 RNA RESP QL NAA+PROBE: NOT DETECTED
T&S EXPIRATION DATE: NORMAL
WBC # BLD AUTO: 9.94 10*3/MM3 (ref 3.4–10.8)

## 2021-03-15 PROCEDURE — 0502F SUBSEQUENT PRENATAL CARE: CPT | Performed by: OBSTETRICS & GYNECOLOGY

## 2021-03-15 PROCEDURE — 86900 BLOOD TYPING SEROLOGIC ABO: CPT

## 2021-03-15 PROCEDURE — 59025 FETAL NON-STRESS TEST: CPT | Performed by: OBSTETRICS & GYNECOLOGY

## 2021-03-15 PROCEDURE — 86850 RBC ANTIBODY SCREEN: CPT

## 2021-03-15 PROCEDURE — 86901 BLOOD TYPING SEROLOGIC RH(D): CPT

## 2021-03-15 PROCEDURE — C9803 HOPD COVID-19 SPEC COLLECT: HCPCS

## 2021-03-15 PROCEDURE — 85027 COMPLETE CBC AUTOMATED: CPT

## 2021-03-15 PROCEDURE — 36415 COLL VENOUS BLD VENIPUNCTURE: CPT

## 2021-03-15 PROCEDURE — U0003 INFECTIOUS AGENT DETECTION BY NUCLEIC ACID (DNA OR RNA); SEVERE ACUTE RESPIRATORY SYNDROME CORONAVIRUS 2 (SARS-COV-2) (CORONAVIRUS DISEASE [COVID-19]), AMPLIFIED PROBE TECHNIQUE, MAKING USE OF HIGH THROUGHPUT TECHNOLOGIES AS DESCRIBED BY CMS-2020-01-R: HCPCS

## 2021-03-15 RX ORDER — SODIUM CHLORIDE 0.9 % (FLUSH) 0.9 %
1-10 SYRINGE (ML) INJECTION AS NEEDED
Status: CANCELLED | OUTPATIENT
Start: 2021-03-15

## 2021-03-15 RX ORDER — ONDANSETRON 4 MG/1
4 TABLET, FILM COATED ORAL EVERY 6 HOURS PRN
Status: CANCELLED | OUTPATIENT
Start: 2021-03-15

## 2021-03-15 RX ORDER — OXYTOCIN-SODIUM CHLORIDE 0.9% IV SOLN 30 UNIT/500ML 30-0.9/5 UT/ML-%
650 SOLUTION INTRAVENOUS ONCE
Status: CANCELLED | OUTPATIENT
Start: 2021-03-15 | End: 2021-03-15

## 2021-03-15 RX ORDER — ONDANSETRON 2 MG/ML
4 INJECTION INTRAMUSCULAR; INTRAVENOUS EVERY 6 HOURS PRN
Status: CANCELLED | OUTPATIENT
Start: 2021-03-15

## 2021-03-15 RX ORDER — MISOPROSTOL 200 UG/1
800 TABLET ORAL AS NEEDED
Status: CANCELLED | OUTPATIENT
Start: 2021-03-15

## 2021-03-15 RX ORDER — LIDOCAINE HYDROCHLORIDE 10 MG/ML
5 INJECTION, SOLUTION EPIDURAL; INFILTRATION; INTRACAUDAL; PERINEURAL AS NEEDED
Status: CANCELLED | OUTPATIENT
Start: 2021-03-15

## 2021-03-15 RX ORDER — HYDROMORPHONE HYDROCHLORIDE 1 MG/ML
0.5 INJECTION, SOLUTION INTRAMUSCULAR; INTRAVENOUS; SUBCUTANEOUS
Status: CANCELLED | OUTPATIENT
Start: 2021-03-15 | End: 2021-03-25

## 2021-03-15 RX ORDER — SODIUM CHLORIDE 0.9 % (FLUSH) 0.9 %
3 SYRINGE (ML) INJECTION EVERY 12 HOURS SCHEDULED
Status: CANCELLED | OUTPATIENT
Start: 2021-03-15

## 2021-03-15 RX ORDER — ACETAMINOPHEN 500 MG
1000 TABLET ORAL ONCE
Status: CANCELLED | OUTPATIENT
Start: 2021-03-15 | End: 2021-03-15

## 2021-03-15 RX ORDER — KETOROLAC TROMETHAMINE 30 MG/ML
30 INJECTION, SOLUTION INTRAMUSCULAR; INTRAVENOUS ONCE
Status: CANCELLED | OUTPATIENT
Start: 2021-03-15 | End: 2021-03-15

## 2021-03-15 RX ORDER — TRISODIUM CITRATE DIHYDRATE AND CITRIC ACID MONOHYDRATE 500; 334 MG/5ML; MG/5ML
30 SOLUTION ORAL ONCE
Status: CANCELLED | OUTPATIENT
Start: 2021-03-15 | End: 2021-03-15

## 2021-03-15 RX ORDER — CARBOPROST TROMETHAMINE 250 UG/ML
250 INJECTION, SOLUTION INTRAMUSCULAR AS NEEDED
Status: CANCELLED | OUTPATIENT
Start: 2021-03-15

## 2021-03-15 RX ORDER — METHYLERGONOVINE MALEATE 0.2 MG/ML
200 INJECTION INTRAVENOUS AS NEEDED
Status: CANCELLED | OUTPATIENT
Start: 2021-03-15

## 2021-03-15 RX ORDER — OXYTOCIN-SODIUM CHLORIDE 0.9% IV SOLN 30 UNIT/500ML 30-0.9/5 UT/ML-%
85 SOLUTION INTRAVENOUS ONCE
Status: CANCELLED | OUTPATIENT
Start: 2021-03-15 | End: 2021-03-15

## 2021-03-15 RX ORDER — SODIUM CHLORIDE, SODIUM LACTATE, POTASSIUM CHLORIDE, CALCIUM CHLORIDE 600; 310; 30; 20 MG/100ML; MG/100ML; MG/100ML; MG/100ML
125 INJECTION, SOLUTION INTRAVENOUS CONTINUOUS
Status: CANCELLED | OUTPATIENT
Start: 2021-03-15

## 2021-03-15 RX ORDER — CEFAZOLIN SODIUM 2 G/100ML
2 INJECTION, SOLUTION INTRAVENOUS ONCE
Status: CANCELLED | OUTPATIENT
Start: 2021-03-15 | End: 2021-03-15

## 2021-03-15 NOTE — PROGRESS NOTES
OB FOLLOW UP  CC- Here for care of pregnancy        China Carbajal is a 35 y.o.  38w0d patient being seen today for her obstetrical follow up visit. Patient reports backache and moderate edema in feet.     Her prenatal care is complicated by (and status) :    Patient Active Problem List   Diagnosis   • Palpitations   • Chronic hypertension in pregnancy   • Bilateral leg edema   • History of HELLP syndrome, currently pregnant   • Previous  section   • Pregnancy         Flu Status: Declines  GBS Status: Was already done and it was negative.   Her Delivery Plan is: Repeat . Scheduled  Ultrasound Today: No.    ROS -   Patient Reports : moderate edema in feet  Patient Denies: Loss of Fluid, Vaginal Spotting, Vision Changes, Headaches, Nausea , Vomiting , Contractions and Epigastric pain  Fetal Movement : normal  All other systems reviewed and are negative.       The additional following portions of the patient's history were reviewed and updated as appropriate: allergies, current medications, past family history, past medical history, past social history, past surgical history and problem list.    I have reviewed and agree with the HPI, ROS, and historical information as entered above. Gloria Dee MD        /84   Wt 111 kg (244 lb 9.6 oz)   LMP  (LMP Unknown)   BMI 36.12 kg/m²     EXAM:     FHT: 140 BPM   Uterine Size: size equals dates  Pelvic Exam: No    Urine glucose/protein: See prenatal flowsheet       Assessment and Plan    Fetal Non-Stress Test  Patient: China Carbajal  : 1985  MRN: [id number]  CSN: [id number]  Date: 03/15/2021    Estimated Date of Delivery: 3/29/21  Gestational Age: 38w0d    Indication for NST chronic hypertension       Time On 3:36 pm   Time Off 4:14 pm       Interpretation    Baseline  beats per minute   Category 1   Decelerations Absent       Additional Comments  greater than 20 minutes       Recommendations for f/u  plan for   delivery in 2 days       Gloria Dee MD        Problem List Items Addressed This Visit        Gravid and     Chronic hypertension in pregnancy    Overview     Chronic HTN. Labetalol 100mg bid.  Will start twice weekly NSTs at 32 weeks.  Growth ultrasound at 35 weeks shows 6 pounds which is 50th percentile.         Relevant Medications    labetalol (NORMODYNE) 100 MG tablet    History of HELLP syndrome, currently pregnant - Primary    Overview     , delivered @ 32wks  PEP labs 21         Relevant Orders    POC Urinalysis Dipstick (Completed)    Previous  section    Overview     3/2014 @ 32wks for HELLP syndrome. 3#10oz         Pregnancy          1. Pregnancy at 38w0d  2. Fetal status reassuring.   3. Activity and Exercise discussed.  Return in about 2 weeks (around 3/29/2021) for With NP for incision check.    Gloria Dee MD  03/15/2021

## 2021-03-17 ENCOUNTER — ANESTHESIA EVENT (OUTPATIENT)
Dept: LABOR AND DELIVERY | Facility: HOSPITAL | Age: 36
End: 2021-03-17

## 2021-03-17 ENCOUNTER — ANESTHESIA (OUTPATIENT)
Dept: LABOR AND DELIVERY | Facility: HOSPITAL | Age: 36
End: 2021-03-17

## 2021-03-17 ENCOUNTER — HOSPITAL ENCOUNTER (INPATIENT)
Facility: HOSPITAL | Age: 36
LOS: 4 days | Discharge: HOME | End: 2021-03-21
Attending: OBSTETRICS & GYNECOLOGY | Admitting: OBSTETRICS & GYNECOLOGY

## 2021-03-17 DIAGNOSIS — Z98.891 PREVIOUS CESAREAN SECTION: ICD-10-CM

## 2021-03-17 DIAGNOSIS — Z3A.38 38 WEEKS GESTATION OF PREGNANCY: ICD-10-CM

## 2021-03-17 DIAGNOSIS — Z98.891 STATUS POST CESAREAN SECTION: Primary | ICD-10-CM

## 2021-03-17 DIAGNOSIS — O10.919 CHRONIC HYPERTENSION IN PREGNANCY: ICD-10-CM

## 2021-03-17 DIAGNOSIS — R00.2 PALPITATIONS: ICD-10-CM

## 2021-03-17 DIAGNOSIS — E66.01 MORBIDLY OBESE (HCC): ICD-10-CM

## 2021-03-17 DIAGNOSIS — I10 ESSENTIAL HYPERTENSION: ICD-10-CM

## 2021-03-17 LAB
ALP SERPL-CCNC: 115 U/L (ref 39–117)
ALT SERPL W P-5'-P-CCNC: 13 U/L (ref 1–33)
AST SERPL-CCNC: 25 U/L (ref 1–32)
BILIRUB SERPL-MCNC: 0.3 MG/DL (ref 0–1.2)
CREAT SERPL-MCNC: 0.45 MG/DL (ref 0.57–1)
LDH SERPL-CCNC: 186 U/L (ref 135–214)
URATE SERPL-MCNC: 4.1 MG/DL (ref 2.4–5.7)

## 2021-03-17 PROCEDURE — 84075 ASSAY ALKALINE PHOSPHATASE: CPT | Performed by: OBSTETRICS & GYNECOLOGY

## 2021-03-17 PROCEDURE — 25010000002 MIDAZOLAM PER 1 MG: Performed by: ANESTHESIOLOGY

## 2021-03-17 PROCEDURE — C1765 ADHESION BARRIER: HCPCS | Performed by: OBSTETRICS & GYNECOLOGY

## 2021-03-17 PROCEDURE — 59025 FETAL NON-STRESS TEST: CPT

## 2021-03-17 PROCEDURE — 82565 ASSAY OF CREATININE: CPT | Performed by: OBSTETRICS & GYNECOLOGY

## 2021-03-17 PROCEDURE — 25010000002 KETOROLAC TROMETHAMINE PER 15 MG: Performed by: OBSTETRICS & GYNECOLOGY

## 2021-03-17 PROCEDURE — 83615 LACTATE (LD) (LDH) ENZYME: CPT | Performed by: OBSTETRICS & GYNECOLOGY

## 2021-03-17 PROCEDURE — 25010000002 ONDANSETRON PER 1 MG: Performed by: ANESTHESIOLOGY

## 2021-03-17 PROCEDURE — 84460 ALANINE AMINO (ALT) (SGPT): CPT | Performed by: OBSTETRICS & GYNECOLOGY

## 2021-03-17 PROCEDURE — 25010000003 CEFAZOLIN IN DEXTROSE 2-4 GM/100ML-% SOLUTION: Performed by: OBSTETRICS & GYNECOLOGY

## 2021-03-17 PROCEDURE — 84450 TRANSFERASE (AST) (SGOT): CPT | Performed by: OBSTETRICS & GYNECOLOGY

## 2021-03-17 PROCEDURE — 25010000002 FENTANYL CITRATE (PF) 100 MCG/2ML SOLUTION: Performed by: ANESTHESIOLOGY

## 2021-03-17 PROCEDURE — 25010000003 MORPHINE PER 10 MG: Performed by: ANESTHESIOLOGY

## 2021-03-17 PROCEDURE — 82247 BILIRUBIN TOTAL: CPT | Performed by: OBSTETRICS & GYNECOLOGY

## 2021-03-17 PROCEDURE — 84550 ASSAY OF BLOOD/URIC ACID: CPT | Performed by: OBSTETRICS & GYNECOLOGY

## 2021-03-17 PROCEDURE — 59510 CESAREAN DELIVERY: CPT | Performed by: OBSTETRICS & GYNECOLOGY

## 2021-03-17 DEVICE — ABSORBABLE ADHESION BARRIER
Type: IMPLANTABLE DEVICE | Status: FUNCTIONAL
Brand: GYNECARE INTERCEED

## 2021-03-17 RX ORDER — SODIUM CHLORIDE 0.9 % (FLUSH) 0.9 %
1-10 SYRINGE (ML) INJECTION AS NEEDED
Status: DISCONTINUED | OUTPATIENT
Start: 2021-03-17 | End: 2021-03-17 | Stop reason: HOSPADM

## 2021-03-17 RX ORDER — LIDOCAINE HYDROCHLORIDE 10 MG/ML
5 INJECTION, SOLUTION EPIDURAL; INFILTRATION; INTRACAUDAL; PERINEURAL AS NEEDED
Status: DISCONTINUED | OUTPATIENT
Start: 2021-03-17 | End: 2021-03-17 | Stop reason: HOSPADM

## 2021-03-17 RX ORDER — METHYLERGONOVINE MALEATE 0.2 MG/ML
200 INJECTION INTRAVENOUS AS NEEDED
Status: DISCONTINUED | OUTPATIENT
Start: 2021-03-17 | End: 2021-03-17

## 2021-03-17 RX ORDER — HYDROMORPHONE HYDROCHLORIDE 1 MG/ML
0.5 INJECTION, SOLUTION INTRAMUSCULAR; INTRAVENOUS; SUBCUTANEOUS
Status: DISCONTINUED | OUTPATIENT
Start: 2021-03-17 | End: 2021-03-17

## 2021-03-17 RX ORDER — MORPHINE SULFATE 0.5 MG/ML
INJECTION, SOLUTION EPIDURAL; INTRATHECAL; INTRAVENOUS AS NEEDED
Status: DISCONTINUED | OUTPATIENT
Start: 2021-03-17 | End: 2021-03-17 | Stop reason: SURG

## 2021-03-17 RX ORDER — OXYTOCIN-SODIUM CHLORIDE 0.9% IV SOLN 30 UNIT/500ML 30-0.9/5 UT/ML-%
650 SOLUTION INTRAVENOUS ONCE
Status: DISCONTINUED | OUTPATIENT
Start: 2021-03-17 | End: 2021-03-17

## 2021-03-17 RX ORDER — OXYCODONE HYDROCHLORIDE 5 MG/1
5 TABLET ORAL EVERY 4 HOURS PRN
Status: DISCONTINUED | OUTPATIENT
Start: 2021-03-17 | End: 2021-03-21 | Stop reason: HOSPADM

## 2021-03-17 RX ORDER — DIPHENHYDRAMINE HCL 25 MG
25 CAPSULE ORAL EVERY 4 HOURS PRN
Status: DISCONTINUED | OUTPATIENT
Start: 2021-03-17 | End: 2021-03-21 | Stop reason: HOSPADM

## 2021-03-17 RX ORDER — CARBOPROST TROMETHAMINE 250 UG/ML
250 INJECTION, SOLUTION INTRAMUSCULAR AS NEEDED
Status: DISCONTINUED | OUTPATIENT
Start: 2021-03-17 | End: 2021-03-21 | Stop reason: HOSPADM

## 2021-03-17 RX ORDER — IBUPROFEN 600 MG/1
600 TABLET ORAL EVERY 6 HOURS
Status: DISCONTINUED | OUTPATIENT
Start: 2021-03-18 | End: 2021-03-21 | Stop reason: HOSPADM

## 2021-03-17 RX ORDER — FENTANYL CITRATE 50 UG/ML
INJECTION, SOLUTION INTRAMUSCULAR; INTRAVENOUS AS NEEDED
Status: DISCONTINUED | OUTPATIENT
Start: 2021-03-17 | End: 2021-03-17 | Stop reason: SURG

## 2021-03-17 RX ORDER — PRENATAL VIT/IRON FUM/FOLIC AC 27MG-0.8MG
1 TABLET ORAL DAILY
Status: DISCONTINUED | OUTPATIENT
Start: 2021-03-17 | End: 2021-03-21 | Stop reason: HOSPADM

## 2021-03-17 RX ORDER — CEFAZOLIN SODIUM 2 G/100ML
2 INJECTION, SOLUTION INTRAVENOUS ONCE
Status: COMPLETED | OUTPATIENT
Start: 2021-03-17 | End: 2021-03-17

## 2021-03-17 RX ORDER — FENTANYL CITRATE 50 UG/ML
50 INJECTION, SOLUTION INTRAMUSCULAR; INTRAVENOUS
Status: DISCONTINUED | OUTPATIENT
Start: 2021-03-17 | End: 2021-03-17

## 2021-03-17 RX ORDER — OXYTOCIN-SODIUM CHLORIDE 0.9% IV SOLN 30 UNIT/500ML 30-0.9/5 UT/ML-%
85 SOLUTION INTRAVENOUS ONCE
Status: DISCONTINUED | OUTPATIENT
Start: 2021-03-17 | End: 2021-03-17

## 2021-03-17 RX ORDER — SODIUM CHLORIDE 0.9 % (FLUSH) 0.9 %
1-10 SYRINGE (ML) INJECTION AS NEEDED
Status: DISCONTINUED | OUTPATIENT
Start: 2021-03-17 | End: 2021-03-21

## 2021-03-17 RX ORDER — NALOXONE HCL 0.4 MG/ML
0.4 VIAL (ML) INJECTION
Status: ACTIVE | OUTPATIENT
Start: 2021-03-17 | End: 2021-03-18

## 2021-03-17 RX ORDER — BUPIVACAINE HYDROCHLORIDE 7.5 MG/ML
INJECTION, SOLUTION EPIDURAL; RETROBULBAR AS NEEDED
Status: DISCONTINUED | OUTPATIENT
Start: 2021-03-17 | End: 2021-03-17 | Stop reason: SURG

## 2021-03-17 RX ORDER — KETOROLAC TROMETHAMINE 30 MG/ML
30 INJECTION, SOLUTION INTRAMUSCULAR; INTRAVENOUS ONCE
Status: DISCONTINUED | OUTPATIENT
Start: 2021-03-17 | End: 2021-03-17 | Stop reason: SDUPTHER

## 2021-03-17 RX ORDER — OXYTOCIN-SODIUM CHLORIDE 0.9% IV SOLN 30 UNIT/500ML 30-0.9/5 UT/ML-%
SOLUTION INTRAVENOUS AS NEEDED
Status: DISCONTINUED | OUTPATIENT
Start: 2021-03-17 | End: 2021-03-17 | Stop reason: SURG

## 2021-03-17 RX ORDER — SODIUM CHLORIDE 0.9 % (FLUSH) 0.9 %
3 SYRINGE (ML) INJECTION EVERY 12 HOURS SCHEDULED
Status: DISCONTINUED | OUTPATIENT
Start: 2021-03-17 | End: 2021-03-17 | Stop reason: HOSPADM

## 2021-03-17 RX ORDER — METHYLERGONOVINE MALEATE 0.2 MG/ML
200 INJECTION INTRAVENOUS AS NEEDED
Status: DISCONTINUED | OUTPATIENT
Start: 2021-03-17 | End: 2021-03-21 | Stop reason: HOSPADM

## 2021-03-17 RX ORDER — ACETAMINOPHEN 500 MG
1000 TABLET ORAL ONCE
Status: COMPLETED | OUTPATIENT
Start: 2021-03-17 | End: 2021-03-17

## 2021-03-17 RX ORDER — CALCIUM CARBONATE 200(500)MG
1 TABLET,CHEWABLE ORAL EVERY 6 HOURS PRN
Status: DISCONTINUED | OUTPATIENT
Start: 2021-03-17 | End: 2021-03-21 | Stop reason: HOSPADM

## 2021-03-17 RX ORDER — SIMETHICONE 80 MG
80 TABLET,CHEWABLE ORAL 4 TIMES DAILY PRN
Status: DISCONTINUED | OUTPATIENT
Start: 2021-03-17 | End: 2021-03-21 | Stop reason: HOSPADM

## 2021-03-17 RX ORDER — SODIUM CHLORIDE 0.9 % (FLUSH) 0.9 %
3 SYRINGE (ML) INJECTION EVERY 12 HOURS SCHEDULED
Status: DISCONTINUED | OUTPATIENT
Start: 2021-03-17 | End: 2021-03-19

## 2021-03-17 RX ORDER — FAMOTIDINE 10 MG/ML
INJECTION, SOLUTION INTRAVENOUS AS NEEDED
Status: DISCONTINUED | OUTPATIENT
Start: 2021-03-17 | End: 2021-03-17 | Stop reason: SURG

## 2021-03-17 RX ORDER — LIDOCAINE HYDROCHLORIDE 10 MG/ML
5 INJECTION, SOLUTION EPIDURAL; INFILTRATION; INTRACAUDAL; PERINEURAL AS NEEDED
Status: DISCONTINUED | OUTPATIENT
Start: 2021-03-17 | End: 2021-03-21

## 2021-03-17 RX ORDER — HYDROCORTISONE 25 MG/G
1 CREAM TOPICAL AS NEEDED
Status: DISCONTINUED | OUTPATIENT
Start: 2021-03-17 | End: 2021-03-21 | Stop reason: HOSPADM

## 2021-03-17 RX ORDER — PROMETHAZINE HYDROCHLORIDE 25 MG/1
25 TABLET ORAL EVERY 6 HOURS PRN
Status: DISCONTINUED | OUTPATIENT
Start: 2021-03-17 | End: 2021-03-21 | Stop reason: HOSPADM

## 2021-03-17 RX ORDER — DIPHENHYDRAMINE HYDROCHLORIDE 50 MG/ML
25 INJECTION INTRAMUSCULAR; INTRAVENOUS EVERY 4 HOURS PRN
Status: DISCONTINUED | OUTPATIENT
Start: 2021-03-17 | End: 2021-03-21

## 2021-03-17 RX ORDER — LANOLIN
CREAM (ML) TOPICAL
Status: DISCONTINUED | OUTPATIENT
Start: 2021-03-17 | End: 2021-03-21 | Stop reason: HOSPADM

## 2021-03-17 RX ORDER — DOCUSATE SODIUM 100 MG/1
100 CAPSULE, LIQUID FILLED ORAL 2 TIMES DAILY PRN
Status: DISCONTINUED | OUTPATIENT
Start: 2021-03-17 | End: 2021-03-21 | Stop reason: HOSPADM

## 2021-03-17 RX ORDER — ONDANSETRON 2 MG/ML
4 INJECTION INTRAMUSCULAR; INTRAVENOUS EVERY 6 HOURS PRN
Status: DISCONTINUED | OUTPATIENT
Start: 2021-03-17 | End: 2021-03-21 | Stop reason: HOSPADM

## 2021-03-17 RX ORDER — ONDANSETRON 2 MG/ML
4 INJECTION INTRAMUSCULAR; INTRAVENOUS ONCE AS NEEDED
Status: DISCONTINUED | OUTPATIENT
Start: 2021-03-17 | End: 2021-03-21

## 2021-03-17 RX ORDER — ONDANSETRON 4 MG/1
4 TABLET, FILM COATED ORAL EVERY 8 HOURS PRN
Status: DISCONTINUED | OUTPATIENT
Start: 2021-03-17 | End: 2021-03-21 | Stop reason: HOSPADM

## 2021-03-17 RX ORDER — PROMETHAZINE HYDROCHLORIDE 12.5 MG/1
12.5 SUPPOSITORY RECTAL EVERY 6 HOURS PRN
Status: DISCONTINUED | OUTPATIENT
Start: 2021-03-17 | End: 2021-03-21 | Stop reason: HOSPADM

## 2021-03-17 RX ORDER — CARBOPROST TROMETHAMINE 250 UG/ML
250 INJECTION, SOLUTION INTRAMUSCULAR AS NEEDED
Status: DISCONTINUED | OUTPATIENT
Start: 2021-03-17 | End: 2021-03-17

## 2021-03-17 RX ORDER — ACETAMINOPHEN 500 MG
1000 TABLET ORAL EVERY 6 HOURS
Status: COMPLETED | OUTPATIENT
Start: 2021-03-17 | End: 2021-03-18

## 2021-03-17 RX ORDER — ONDANSETRON 2 MG/ML
INJECTION INTRAMUSCULAR; INTRAVENOUS AS NEEDED
Status: DISCONTINUED | OUTPATIENT
Start: 2021-03-17 | End: 2021-03-17 | Stop reason: SURG

## 2021-03-17 RX ORDER — MIDAZOLAM HYDROCHLORIDE 1 MG/ML
INJECTION INTRAMUSCULAR; INTRAVENOUS AS NEEDED
Status: DISCONTINUED | OUTPATIENT
Start: 2021-03-17 | End: 2021-03-17 | Stop reason: SURG

## 2021-03-17 RX ORDER — SODIUM CHLORIDE, SODIUM LACTATE, POTASSIUM CHLORIDE, CALCIUM CHLORIDE 600; 310; 30; 20 MG/100ML; MG/100ML; MG/100ML; MG/100ML
125 INJECTION, SOLUTION INTRAVENOUS CONTINUOUS
Status: DISCONTINUED | OUTPATIENT
Start: 2021-03-17 | End: 2021-03-17

## 2021-03-17 RX ORDER — MISOPROSTOL 200 UG/1
800 TABLET ORAL AS NEEDED
Status: DISCONTINUED | OUTPATIENT
Start: 2021-03-17 | End: 2021-03-21 | Stop reason: HOSPADM

## 2021-03-17 RX ORDER — ACETAMINOPHEN 325 MG/1
650 TABLET ORAL EVERY 6 HOURS
Status: DISCONTINUED | OUTPATIENT
Start: 2021-03-18 | End: 2021-03-21 | Stop reason: HOSPADM

## 2021-03-17 RX ORDER — TRISODIUM CITRATE DIHYDRATE AND CITRIC ACID MONOHYDRATE 500; 334 MG/5ML; MG/5ML
30 SOLUTION ORAL ONCE
Status: COMPLETED | OUTPATIENT
Start: 2021-03-17 | End: 2021-03-17

## 2021-03-17 RX ORDER — LABETALOL 200 MG/1
100 TABLET, FILM COATED ORAL 2 TIMES DAILY
Status: DISCONTINUED | OUTPATIENT
Start: 2021-03-17 | End: 2021-03-21

## 2021-03-17 RX ORDER — ONDANSETRON 4 MG/1
4 TABLET, FILM COATED ORAL EVERY 6 HOURS PRN
Status: DISCONTINUED | OUTPATIENT
Start: 2021-03-17 | End: 2021-03-21 | Stop reason: HOSPADM

## 2021-03-17 RX ORDER — KETOROLAC TROMETHAMINE 15 MG/ML
15 INJECTION, SOLUTION INTRAMUSCULAR; INTRAVENOUS EVERY 6 HOURS
Status: COMPLETED | OUTPATIENT
Start: 2021-03-17 | End: 2021-03-18

## 2021-03-17 RX ORDER — ONDANSETRON 4 MG/1
4 TABLET, FILM COATED ORAL EVERY 8 HOURS PRN
COMMUNITY
End: 2021-03-21 | Stop reason: HOSPADM

## 2021-03-17 RX ORDER — DIPHENHYDRAMINE HYDROCHLORIDE 50 MG/ML
25 INJECTION INTRAMUSCULAR; INTRAVENOUS EVERY 4 HOURS PRN
Status: DISCONTINUED | OUTPATIENT
Start: 2021-03-17 | End: 2021-03-21 | Stop reason: HOSPADM

## 2021-03-17 RX ORDER — OXYCODONE HYDROCHLORIDE 5 MG/1
10 TABLET ORAL EVERY 4 HOURS PRN
Status: DISCONTINUED | OUTPATIENT
Start: 2021-03-17 | End: 2021-03-21 | Stop reason: HOSPADM

## 2021-03-17 RX ADMIN — ACETAMINOPHEN 1000 MG: 500 TABLET ORAL at 23:44

## 2021-03-17 RX ADMIN — MIDAZOLAM 1 MG: 1 INJECTION INTRAMUSCULAR; INTRAVENOUS at 12:21

## 2021-03-17 RX ADMIN — SODIUM CHLORIDE, POTASSIUM CHLORIDE, SODIUM LACTATE AND CALCIUM CHLORIDE 1000 ML: 600; 310; 30; 20 INJECTION, SOLUTION INTRAVENOUS at 10:37

## 2021-03-17 RX ADMIN — BUPIVACAINE HYDROCHLORIDE 1.6 ML: 7.5 INJECTION, SOLUTION EPIDURAL; RETROBULBAR at 12:28

## 2021-03-17 RX ADMIN — MORPHINE SULFATE 0.15 MG: 0.5 INJECTION, SOLUTION EPIDURAL; INTRATHECAL; INTRAVENOUS at 12:28

## 2021-03-17 RX ADMIN — OXYTOCIN 500 ML: 10 INJECTION INTRAVENOUS at 12:46

## 2021-03-17 RX ADMIN — PRENATAL VITAMINS-IRON FUMARATE 27 MG IRON-FOLIC ACID 0.8 MG TABLET 1 TABLET: at 18:01

## 2021-03-17 RX ADMIN — SODIUM CHLORIDE, POTASSIUM CHLORIDE, SODIUM LACTATE AND CALCIUM CHLORIDE: 600; 310; 30; 20 INJECTION, SOLUTION INTRAVENOUS at 12:32

## 2021-03-17 RX ADMIN — FAMOTIDINE 20 MG: 10 INJECTION, SOLUTION INTRAVENOUS at 12:21

## 2021-03-17 RX ADMIN — KETOROLAC TROMETHAMINE 15 MG: 15 INJECTION, SOLUTION INTRAMUSCULAR; INTRAVENOUS at 20:29

## 2021-03-17 RX ADMIN — SODIUM CHLORIDE, POTASSIUM CHLORIDE, SODIUM LACTATE AND CALCIUM CHLORIDE 125 ML/HR: 600; 310; 30; 20 INJECTION, SOLUTION INTRAVENOUS at 14:23

## 2021-03-17 RX ADMIN — SODIUM CITRATE AND CITRIC ACID MONOHYDRATE 30 ML: 500; 334 SOLUTION ORAL at 12:08

## 2021-03-17 RX ADMIN — ONDANSETRON 4 MG: 2 INJECTION INTRAMUSCULAR; INTRAVENOUS at 12:21

## 2021-03-17 RX ADMIN — ACETAMINOPHEN 1000 MG: 500 TABLET ORAL at 10:50

## 2021-03-17 RX ADMIN — SODIUM CHLORIDE, POTASSIUM CHLORIDE, SODIUM LACTATE AND CALCIUM CHLORIDE 125 ML/HR: 600; 310; 30; 20 INJECTION, SOLUTION INTRAVENOUS at 11:05

## 2021-03-17 RX ADMIN — OXYTOCIN 500 ML: 10 INJECTION INTRAVENOUS at 13:13

## 2021-03-17 RX ADMIN — FENTANYL CITRATE 20 MCG: 50 INJECTION, SOLUTION INTRAMUSCULAR; INTRAVENOUS at 12:28

## 2021-03-17 RX ADMIN — SIMETHICONE 80 MG: 80 TABLET, CHEWABLE ORAL at 20:29

## 2021-03-17 RX ADMIN — LABETALOL HYDROCHLORIDE 100 MG: 200 TABLET, FILM COATED ORAL at 20:34

## 2021-03-17 RX ADMIN — ACETAMINOPHEN 1000 MG: 500 TABLET ORAL at 18:01

## 2021-03-17 RX ADMIN — DOCUSATE SODIUM 100 MG: 100 CAPSULE, LIQUID FILLED ORAL at 20:29

## 2021-03-17 RX ADMIN — CEFAZOLIN SODIUM 2 G: 2 INJECTION, SOLUTION INTRAVENOUS at 12:07

## 2021-03-17 NOTE — ANESTHESIA PREPROCEDURE EVALUATION
Anesthesia Evaluation     Patient summary reviewed and Nursing notes reviewed   NPO Solid Status: > 8 hours  NPO Liquid Status: > 8 hours           Airway   Dental      Pulmonary    (+) asthma,  Cardiovascular     (+) hypertension,       Neuro/Psych- negative ROS  GI/Hepatic/Renal/Endo    (+) obesity, morbid obesity,      Musculoskeletal (-) negative ROS    Abdominal    Substance History - negative use     OB/GYN    (+) Pregnant, pregnancy induced hypertension    Comment: Previous C/S 2014 with H/O HELLP Syndrome      Other                        Anesthesia Plan    ASA 3     spinal and ITN       Anesthetic plan, all risks, benefits, and alternatives have been provided, discussed and informed consent has been obtained with: patient.

## 2021-03-17 NOTE — ANESTHESIA POSTPROCEDURE EVALUATION
Patient: China LEONG Solomon    Procedure Summary     Date: 21 Room / Location: [facility] LABOR DELIVERY   [facility]OR DELIVERY    Anesthesia Start: 1221 Anesthesia Stop: 1325    Procedure:  SECTION REPEAT (N/A Abdomen) Diagnosis:     Surgeons: Gloria Dee MD Provider: Indu Andino DO    Anesthesia Type: spinal, ITN ASA Status: 3          Anesthesia Type: spinal, ITN    Vitals  Vitals Value Taken Time   /59 21 1324   Temp 97.6    Pulse 70 21 1330   Resp 18    SpO2 97 % 21 1330   Vitals shown include unvalidated device data.        Post Anesthesia Care and Evaluation    Patient location during evaluation: bedside  Patient participation: complete - patient participated  Level of consciousness: awake and alert  Pain score: 0  Pain management: adequate  Airway patency: patent  Anesthetic complications: No anesthetic complications    Cardiovascular status: acceptable  Respiratory status: acceptable  Hydration status: acceptable

## 2021-03-17 NOTE — ANESTHESIA PROCEDURE NOTES
Spinal Block      Patient reassessed immediately prior to procedure    Patient location during procedure: OB  Indication:procedure for pain  Performed By  Anesthesiologist: Indu Andino DO  Preanesthetic Checklist  Completed: patient identified, IV checked, risks and benefits discussed, surgical consent, monitors and equipment checked, pre-op evaluation and timeout performed  Spinal Block Prep:  Patient Position:sitting  Sterile Tech:cap, gloves, mask and sterile barriers  Prep:Betadine  Patient Monitoring:blood pressure monitoring and EKG  Spinal Block Procedure  Approach:midline  Guidance:palpation technique  Location:L3-L4  Needle Type:Melchor  Needle Gauge:25 G  Placement of Spinal needle event:cerebrospinal fluid aspirated  Paresthesia: no  Fluid Appearance:clear     Post Assessment  Patient Tolerance:patient tolerated the procedure well with no apparent complications  Complications no

## 2021-03-18 LAB
BASOPHILS # BLD AUTO: 0.01 10*3/MM3 (ref 0–0.2)
BASOPHILS NFR BLD AUTO: 0.1 % (ref 0–1.5)
DEPRECATED RDW RBC AUTO: 47.8 FL (ref 37–54)
EOSINOPHIL # BLD AUTO: 0.18 10*3/MM3 (ref 0–0.4)
EOSINOPHIL NFR BLD AUTO: 1.9 % (ref 0.3–6.2)
ERYTHROCYTE [DISTWIDTH] IN BLOOD BY AUTOMATED COUNT: 13.2 % (ref 12.3–15.4)
HCT VFR BLD AUTO: 37 % (ref 34–46.6)
HGB BLD-MCNC: 12.1 G/DL (ref 12–15.9)
IMM GRANULOCYTES # BLD AUTO: 0.03 10*3/MM3 (ref 0–0.05)
IMM GRANULOCYTES NFR BLD AUTO: 0.3 % (ref 0–0.5)
LYMPHOCYTES # BLD AUTO: 2.3 10*3/MM3 (ref 0.7–3.1)
LYMPHOCYTES NFR BLD AUTO: 24.3 % (ref 19.6–45.3)
MCH RBC QN AUTO: 32.4 PG (ref 26.6–33)
MCHC RBC AUTO-ENTMCNC: 32.7 G/DL (ref 31.5–35.7)
MCV RBC AUTO: 98.9 FL (ref 79–97)
MONOCYTES # BLD AUTO: 0.75 10*3/MM3 (ref 0.1–0.9)
MONOCYTES NFR BLD AUTO: 7.9 % (ref 5–12)
NEUTROPHILS NFR BLD AUTO: 6.21 10*3/MM3 (ref 1.7–7)
NEUTROPHILS NFR BLD AUTO: 65.5 % (ref 42.7–76)
NRBC BLD AUTO-RTO: 0 /100 WBC (ref 0–0.2)
PLATELET # BLD AUTO: 147 10*3/MM3 (ref 140–450)
PMV BLD AUTO: 11.6 FL (ref 6–12)
RBC # BLD AUTO: 3.74 10*6/MM3 (ref 3.77–5.28)
WBC # BLD AUTO: 9.48 10*3/MM3 (ref 3.4–10.8)

## 2021-03-18 PROCEDURE — 85025 COMPLETE CBC W/AUTO DIFF WBC: CPT | Performed by: OBSTETRICS & GYNECOLOGY

## 2021-03-18 PROCEDURE — 25010000002 KETOROLAC TROMETHAMINE PER 15 MG: Performed by: OBSTETRICS & GYNECOLOGY

## 2021-03-18 PROCEDURE — 0503F POSTPARTUM CARE VISIT: CPT | Performed by: OBSTETRICS & GYNECOLOGY

## 2021-03-18 RX ADMIN — DOCUSATE SODIUM 100 MG: 100 CAPSULE, LIQUID FILLED ORAL at 09:22

## 2021-03-18 RX ADMIN — KETOROLAC TROMETHAMINE 15 MG: 15 INJECTION, SOLUTION INTRAMUSCULAR; INTRAVENOUS at 02:04

## 2021-03-18 RX ADMIN — DOCUSATE SODIUM 100 MG: 100 CAPSULE, LIQUID FILLED ORAL at 21:00

## 2021-03-18 RX ADMIN — KETOROLAC TROMETHAMINE 15 MG: 15 INJECTION, SOLUTION INTRAMUSCULAR; INTRAVENOUS at 14:45

## 2021-03-18 RX ADMIN — SIMETHICONE 80 MG: 80 TABLET, CHEWABLE ORAL at 09:22

## 2021-03-18 RX ADMIN — LABETALOL HYDROCHLORIDE 100 MG: 200 TABLET, FILM COATED ORAL at 09:22

## 2021-03-18 RX ADMIN — ACETAMINOPHEN 1000 MG: 500 TABLET ORAL at 05:43

## 2021-03-18 RX ADMIN — LABETALOL HYDROCHLORIDE 100 MG: 200 TABLET, FILM COATED ORAL at 21:00

## 2021-03-18 RX ADMIN — SIMETHICONE 80 MG: 80 TABLET, CHEWABLE ORAL at 21:00

## 2021-03-18 RX ADMIN — KETOROLAC TROMETHAMINE 15 MG: 15 INJECTION, SOLUTION INTRAMUSCULAR; INTRAVENOUS at 09:22

## 2021-03-18 RX ADMIN — PRENATAL VITAMINS-IRON FUMARATE 27 MG IRON-FOLIC ACID 0.8 MG TABLET 1 TABLET: at 09:22

## 2021-03-18 RX ADMIN — IBUPROFEN 600 MG: 600 TABLET, FILM COATED ORAL at 21:00

## 2021-03-18 RX ADMIN — ACETAMINOPHEN 1000 MG: 500 TABLET ORAL at 13:08

## 2021-03-18 RX ADMIN — ACETAMINOPHEN 650 MG: 325 TABLET ORAL at 19:42

## 2021-03-18 NOTE — ANESTHESIA POSTPROCEDURE EVALUATION
Patient: China LEONG Solomon    Procedure Summary     Date: 21 Room / Location: [facility] LABOR DELIVERY   [facility]    Anesthesia Start: 1221 Anesthesia Stop: 1325    Procedure:  SECTION REPEAT (N/A Abdomen) Diagnosis:     Surgeons: Gloria Dee MD Provider: Indu Andino DO    Anesthesia Type: spinal, ITN ASA Status: 3          Anesthesia Type: spinal, ITN    Vitals  Vitals Value Taken Time   /61 21 1100   Temp 97.8 °F (36.6 °C) 21 1100   Pulse 89 21 1100   Resp 16 21 1100   SpO2 99 % 21 1435           Post Anesthesia Care and Evaluation    Patient location during evaluation: bedside  Patient participation: complete - patient participated  Level of consciousness: awake and alert  Pain management: adequate  Airway patency: patent  Anesthetic complications: No anesthetic complications    Cardiovascular status: acceptable  Respiratory status: acceptable  Hydration status: acceptable  Post Neuraxial Block status: Motor and sensory function returned to baseline and No signs or symptoms of PDPH

## 2021-03-19 PROCEDURE — 0503F POSTPARTUM CARE VISIT: CPT | Performed by: OBSTETRICS & GYNECOLOGY

## 2021-03-19 RX ADMIN — SIMETHICONE 80 MG: 80 TABLET, CHEWABLE ORAL at 20:52

## 2021-03-19 RX ADMIN — IBUPROFEN 600 MG: 600 TABLET, FILM COATED ORAL at 20:03

## 2021-03-19 RX ADMIN — ACETAMINOPHEN 650 MG: 325 TABLET ORAL at 10:37

## 2021-03-19 RX ADMIN — IBUPROFEN 600 MG: 600 TABLET, FILM COATED ORAL at 08:10

## 2021-03-19 RX ADMIN — ACETAMINOPHEN 650 MG: 325 TABLET ORAL at 16:04

## 2021-03-19 RX ADMIN — ACETAMINOPHEN 650 MG: 325 TABLET ORAL at 05:58

## 2021-03-19 RX ADMIN — IBUPROFEN 600 MG: 600 TABLET, FILM COATED ORAL at 02:59

## 2021-03-19 RX ADMIN — LABETALOL HYDROCHLORIDE 100 MG: 200 TABLET, FILM COATED ORAL at 08:10

## 2021-03-19 RX ADMIN — ACETAMINOPHEN 650 MG: 325 TABLET ORAL at 00:29

## 2021-03-19 RX ADMIN — SIMETHICONE 80 MG: 80 TABLET, CHEWABLE ORAL at 08:10

## 2021-03-19 RX ADMIN — SIMETHICONE 80 MG: 80 TABLET, CHEWABLE ORAL at 13:08

## 2021-03-19 RX ADMIN — DOCUSATE SODIUM 100 MG: 100 CAPSULE, LIQUID FILLED ORAL at 20:52

## 2021-03-19 RX ADMIN — PRENATAL VITAMINS-IRON FUMARATE 27 MG IRON-FOLIC ACID 0.8 MG TABLET 1 TABLET: at 08:10

## 2021-03-19 RX ADMIN — ACETAMINOPHEN 650 MG: 325 TABLET ORAL at 23:19

## 2021-03-19 RX ADMIN — LABETALOL HYDROCHLORIDE 100 MG: 200 TABLET, FILM COATED ORAL at 20:52

## 2021-03-19 RX ADMIN — IBUPROFEN 600 MG: 600 TABLET, FILM COATED ORAL at 13:07

## 2021-03-19 RX ADMIN — DOCUSATE SODIUM 100 MG: 100 CAPSULE, LIQUID FILLED ORAL at 08:10

## 2021-03-20 PROCEDURE — 0503F POSTPARTUM CARE VISIT: CPT | Performed by: NURSE PRACTITIONER

## 2021-03-20 RX ADMIN — DOCUSATE SODIUM 100 MG: 100 CAPSULE, LIQUID FILLED ORAL at 13:13

## 2021-03-20 RX ADMIN — ACETAMINOPHEN 650 MG: 325 TABLET ORAL at 13:13

## 2021-03-20 RX ADMIN — PRENATAL VITAMINS-IRON FUMARATE 27 MG IRON-FOLIC ACID 0.8 MG TABLET 1 TABLET: at 08:16

## 2021-03-20 RX ADMIN — SIMETHICONE 80 MG: 80 TABLET, CHEWABLE ORAL at 13:13

## 2021-03-20 RX ADMIN — IBUPROFEN 600 MG: 600 TABLET, FILM COATED ORAL at 15:21

## 2021-03-20 RX ADMIN — ACETAMINOPHEN 650 MG: 325 TABLET ORAL at 18:39

## 2021-03-20 RX ADMIN — IBUPROFEN 600 MG: 600 TABLET, FILM COATED ORAL at 02:35

## 2021-03-20 RX ADMIN — IBUPROFEN 600 MG: 600 TABLET, FILM COATED ORAL at 08:16

## 2021-03-20 RX ADMIN — IBUPROFEN 600 MG: 600 TABLET, FILM COATED ORAL at 21:10

## 2021-03-20 RX ADMIN — ACETAMINOPHEN 650 MG: 325 TABLET ORAL at 05:29

## 2021-03-20 RX ADMIN — LABETALOL HYDROCHLORIDE 100 MG: 200 TABLET, FILM COATED ORAL at 21:10

## 2021-03-20 RX ADMIN — LABETALOL HYDROCHLORIDE 100 MG: 200 TABLET, FILM COATED ORAL at 08:16

## 2021-03-20 RX ADMIN — ACETAMINOPHEN 650 MG: 325 TABLET ORAL at 23:00

## 2021-03-21 VITALS
TEMPERATURE: 97.6 F | RESPIRATION RATE: 18 BRPM | HEART RATE: 75 BPM | DIASTOLIC BLOOD PRESSURE: 81 MMHG | OXYGEN SATURATION: 99 % | SYSTOLIC BLOOD PRESSURE: 140 MMHG

## 2021-03-21 PROBLEM — Z34.90 PREGNANCY: Status: RESOLVED | Noted: 2021-02-04 | Resolved: 2021-03-21

## 2021-03-21 PROBLEM — Z98.891 STATUS POST CESAREAN SECTION: Status: ACTIVE | Noted: 2021-03-21

## 2021-03-21 PROCEDURE — 0503F POSTPARTUM CARE VISIT: CPT | Performed by: OBSTETRICS & GYNECOLOGY

## 2021-03-21 RX ORDER — LABETALOL 100 MG/1
200 TABLET, FILM COATED ORAL 2 TIMES DAILY
Qty: 60 TABLET | Refills: 9 | Status: SHIPPED | OUTPATIENT
Start: 2021-03-21 | End: 2021-03-29 | Stop reason: SDUPTHER

## 2021-03-21 RX ORDER — OXYCODONE HYDROCHLORIDE 5 MG/1
5 TABLET ORAL EVERY 4 HOURS PRN
Qty: 15 TABLET | Refills: 0 | Status: SHIPPED | OUTPATIENT
Start: 2021-03-21 | End: 2021-03-24

## 2021-03-21 RX ORDER — LABETALOL 200 MG/1
200 TABLET, FILM COATED ORAL 2 TIMES DAILY
Status: DISCONTINUED | OUTPATIENT
Start: 2021-03-21 | End: 2021-03-21 | Stop reason: HOSPADM

## 2021-03-21 RX ADMIN — DOCUSATE SODIUM 100 MG: 100 CAPSULE, LIQUID FILLED ORAL at 08:58

## 2021-03-21 RX ADMIN — ACETAMINOPHEN 650 MG: 325 TABLET ORAL at 11:52

## 2021-03-21 RX ADMIN — LABETALOL HYDROCHLORIDE 100 MG: 200 TABLET, FILM COATED ORAL at 08:58

## 2021-03-21 RX ADMIN — IBUPROFEN 600 MG: 600 TABLET, FILM COATED ORAL at 08:58

## 2021-03-21 RX ADMIN — SIMETHICONE 80 MG: 80 TABLET, CHEWABLE ORAL at 08:58

## 2021-03-21 RX ADMIN — PRENATAL VITAMINS-IRON FUMARATE 27 MG IRON-FOLIC ACID 0.8 MG TABLET 1 TABLET: at 08:58

## 2021-03-21 RX ADMIN — IBUPROFEN 600 MG: 600 TABLET, FILM COATED ORAL at 02:08

## 2021-03-21 RX ADMIN — OXYCODONE HYDROCHLORIDE 5 MG: 5 TABLET ORAL at 11:52

## 2021-03-21 RX ADMIN — ACETAMINOPHEN 650 MG: 325 TABLET ORAL at 05:18

## 2021-03-29 ENCOUNTER — POSTPARTUM VISIT (OUTPATIENT)
Dept: OBSTETRICS AND GYNECOLOGY | Facility: CLINIC | Age: 36
End: 2021-03-29

## 2021-03-29 VITALS
TEMPERATURE: 98.7 F | SYSTOLIC BLOOD PRESSURE: 130 MMHG | WEIGHT: 225.9 LBS | DIASTOLIC BLOOD PRESSURE: 92 MMHG | HEIGHT: 68 IN | BODY MASS INDEX: 34.24 KG/M2

## 2021-03-29 DIAGNOSIS — B37.2 SKIN YEAST INFECTION: ICD-10-CM

## 2021-03-29 DIAGNOSIS — I10 ESSENTIAL HYPERTENSION: ICD-10-CM

## 2021-03-29 DIAGNOSIS — O10.919 CHRONIC HYPERTENSION IN PREGNANCY: ICD-10-CM

## 2021-03-29 DIAGNOSIS — Z98.891 STATUS POST CESAREAN SECTION: ICD-10-CM

## 2021-03-29 PROCEDURE — 0503F POSTPARTUM CARE VISIT: CPT | Performed by: NURSE PRACTITIONER

## 2021-03-29 RX ORDER — LABETALOL 100 MG/1
200 TABLET, FILM COATED ORAL 2 TIMES DAILY
Qty: 60 TABLET | Refills: 9 | Status: SHIPPED | OUTPATIENT
Start: 2021-03-29 | End: 2022-08-11 | Stop reason: SDUPTHER

## 2021-03-29 RX ORDER — NYSTATIN 100000 [USP'U]/G
POWDER TOPICAL 2 TIMES DAILY PRN
Qty: 30 G | Refills: 0 | Status: SHIPPED | OUTPATIENT
Start: 2021-03-29 | End: 2021-03-29

## 2021-03-29 RX ORDER — LABETALOL 100 MG/1
200 TABLET, FILM COATED ORAL 2 TIMES DAILY
Qty: 60 TABLET | Refills: 9 | Status: SHIPPED | OUTPATIENT
Start: 2021-03-29 | End: 2021-03-29

## 2021-03-29 RX ORDER — NYSTATIN 100000 [USP'U]/G
POWDER TOPICAL 2 TIMES DAILY PRN
Qty: 30 G | Refills: 0 | Status: SHIPPED | OUTPATIENT
Start: 2021-03-29 | End: 2022-05-05

## 2021-03-29 NOTE — PROGRESS NOTES
Chief Complaint   Patient presents with   • Postpartum Care     BP Check       1 Week Postpartum Visit         China Carbajal is a 35 y.o.  s/p , due to chronic HTN, prior c/s, and IUP at 38w2d on 2021, who presents today for a 1 week postpartum check and BP check.      Pt states at home her BP at home has been running 130-140s/80-low 90's, she is on labetalol 200 mg BID which she was increased to when in the hospital after having the baby. She feels well, denies HA's, vision changes, epigastric pain.     At the time of delivery were you diagnosed with any of the following: None. The incision and is healing well.    Patient denies postpartum depression.  Patient describes bleeding as light.  Patient is breast and bottle feeding with breast milk.  Desires contraceptive methods: Vasectomy  for contraception.  Patient denies bowel or bladder issues.    Postpartum Depression Screening Questionnaire: score = 0, no treatment indicated.  Baby Weight: 7lbs 12.7oz  Baby Discharged: Discharged with Mom  Delivering Physician: Luiz    Last Completed Pap Smear       Status Date      PAP SMEAR No completions recorded        Is the patient due for a pap today? Will evaluate at 6 week PP check    The additional following portions of the patient's history were reviewed and updated as appropriate: allergies, current medications, past family history, past medical history, past social history, past surgical history and problem list.    Review of Systems   Constitutional: Negative.    HENT: Negative.    Eyes: Negative.    Respiratory: Negative.    Cardiovascular: Negative.    Gastrointestinal: Negative.  Abdominal distention: possible hiatal hernia.   Endocrine: Negative.    Genitourinary: Negative.    Musculoskeletal: Negative.    Skin: Negative.    Allergic/Immunologic: Negative.    Neurological: Negative.    Hematological: Negative.    Psychiatric/Behavioral: Negative.      All other systems reviewed and are  "negative.     I have reviewed and agree with the HPI, ROS, and historical information as entered above. Barbaratemitope Giraldo, APRN    /92 (BP Location: Right arm, Patient Position: Sitting, Cuff Size: Large Adult)   Temp 98.7 °F (37.1 °C)   Ht 172.7 cm (68\")   Wt 102 kg (225 lb 14.4 oz)   LMP  (LMP Unknown)   Breastfeeding Yes   BMI 34.35 kg/m²     Physical Exam  Vitals and nursing note reviewed.   Constitutional:       Appearance: Normal appearance.   HENT:      Head: Normocephalic and atraumatic.   Pulmonary:      Effort: Pulmonary effort is normal.   Abdominal:      General: Abdomen is flat.      Palpations: Abdomen is soft.      Comments: Incision well approximated, no drainage, there is surrounding erythema and odor c/w yeast.    Neurological:      Mental Status: She is alert and oriented to person, place, and time.   Psychiatric:         Behavior: Behavior normal.             Assessment and Plan    Problem List Items Addressed This Visit        Gravid and     Chronic hypertension in pregnancy    Overview     Chronic HTN. Labetalol 100mg bid.  Will start twice weekly NSTs at 32 weeks.  Growth ultrasound at 35 weeks shows 6 pounds which is 50th percentile.         Relevant Medications    labetalol (NORMODYNE) 100 MG tablet    Status post  section      Other Visit Diagnoses     Postpartum follow-up    -  Primary    Skin yeast infection        Relevant Medications    nystatin (MYCOSTATIN) 818334 UNIT/GM powder    Essential hypertension        Relevant Medications    labetalol (NORMODYNE) 100 MG tablet          1. S/p , 1 weeks postpartum.  Doing well.    2. CHTN- BP controlled on labetalol 200 mg BID, denies HA's, vision changes and epigastric pain. Continue to monitor at home. Precautions reviewed.   3. Yeast developing around incision, encouraged to keep area clean and dry, Rx nystatin powder.   4. No s/s PP depression.   5. Follow up next week for BP and incision check.     Barbara " Enzo, APRN  03/29/2021

## 2021-04-05 ENCOUNTER — POSTPARTUM VISIT (OUTPATIENT)
Dept: OBSTETRICS AND GYNECOLOGY | Facility: CLINIC | Age: 36
End: 2021-04-05

## 2021-04-05 VITALS
HEIGHT: 68 IN | SYSTOLIC BLOOD PRESSURE: 122 MMHG | WEIGHT: 224.4 LBS | BODY MASS INDEX: 34.01 KG/M2 | DIASTOLIC BLOOD PRESSURE: 80 MMHG

## 2021-04-05 DIAGNOSIS — Z98.891 STATUS POST CESAREAN SECTION: ICD-10-CM

## 2021-04-05 PROCEDURE — 0503F POSTPARTUM CARE VISIT: CPT | Performed by: NURSE PRACTITIONER

## 2021-04-05 NOTE — PROGRESS NOTES
"Chief Complaint   Patient presents with   • Postpartum Care        2 Week Postpartum Visit         China Carbajal is a 35 y.o.  s/p , due to AMA and HTN at 38 weeks 2 days on 2021, who presents today for a 2 week postpartum check.      Patient reports her incision is drainage and opened a little on the left side of her incision. Patient denies postpartum depression.  Patient describes bleeding as absent.  Patient is breast and bottle feeding.  Desires contraceptive methods: partner planned to have vascectomy  for contraception.  Patient denies bowel or bladder issues.    Postpartum Depression Screening Questionnaire: 0, not treatment indicated.  Baby Name: Jackie  Baby weight: 7 lb 13.6 oz  Baby Discharged with Mom      The additional following portions of the patient's history were reviewed and updated as appropriate: allergies, current medications, past family history, past medical history, past social history and past surgical history.      Review of Systems   Constitutional: Negative.    HENT: Negative.    Eyes: Negative.    Respiratory: Negative.    Cardiovascular: Negative.    Gastrointestinal: Negative.    Endocrine: Negative.    Genitourinary: Negative.    Musculoskeletal: Negative.    Skin: Negative.    Allergic/Immunologic: Negative.    Neurological: Negative.    Hematological: Negative.    Psychiatric/Behavioral: Negative.        I have reviewed and agree with the HPI, ROS, and historical information as entered above. Barbara Giraldo, APRN    Objective   /80   Ht 172.7 cm (68\")   Wt 102 kg (224 lb 6.4 oz)   LMP  (LMP Unknown)   BMI 34.12 kg/m²     Physical Exam  Vitals and nursing note reviewed.   Constitutional:       Appearance: Normal appearance.   HENT:      Head: Normocephalic and atraumatic.   Pulmonary:      Effort: Pulmonary effort is normal.   Abdominal:      Palpations: Abdomen is soft.      Comments: Incision well approximated, yeast resolved, no signs of infection.  "   Neurological:      Mental Status: She is alert and oriented to person, place, and time.   Psychiatric:         Behavior: Behavior normal.              Assessment and Plan    Problem List Items Addressed This Visit        Gravid and     Chronic hypertension in pregnancy    Overview     Chronic HTN. Labetalol 100mg bid.  Will start twice weekly NSTs at 32 weeks.  Growth ultrasound at 35 weeks shows 6 pounds which is 50th percentile.         Relevant Medications    labetalol (NORMODYNE) 100 MG tablet    Status post  section      Other Visit Diagnoses     Postpartum follow-up    -  Primary          1. S/p , 2 weeks postpartum.  Doing well.    2. Continued pelvic rest with a return to driving and light physical activity.  3. Contraception: contraceptive methods: Vasectomy    4. Incision healing well, keep clean and dry.   5. CHTN- BP well controlled on labetalol.   6. No s/s PP depression.   7. RTC in 4 weeks.       Barbara Giraldo, OMKAR  2021

## 2021-04-05 NOTE — PROGRESS NOTES
Enter Query Response Below      Query Response: Repeat  section due to chronic hypertension and history of prior  section             If applicable, please update the problem list.    Patient: China Carbajal        : 1985  Account: [id number]           Admit Date: 3/17/2021        Options to Respond to Query:    1. Access the Encounter     a. From the To-Do Side bar, click Respond With Note.     b. Click New Note     c. Answer query within the yellow box.                d. Update the Problem List if applicable.     Dr. Dee:     China Carbajal is a 35 y.o. year old  with an Estimated Date of Delivery: 3/29/21 currently at 38w2d presenting with  No complaints and scheduled repeat  section. Prenatal care significant for AMA (genetic screening was normal), hypertensive disorder of pregnancy (chronic hypertension without superimposed pre-eclampsia) and previous C/S - (desires repeat ).     Can this be further clarified as:    Repeat  section due to chronic hypertension  Repeat  section due to prior  section  Repeat  section due to chronic hypertension and prior  section   Other, please specify: ___________  Unable to determine     By submitting this query, we are merely seeking further clarification of documentation to accurately reflect all conditions that you are monitoring, evaluating, treating or that extend the hospitalization or utilize additional resources of care. Please utilize your independent clinical judgment when addressing the question(s) above.     This query and your response, once completed, will be entered into the legal medical record.    Sincerely,  Olga Mejía RN, BSN, CCDS  Clinical Documentation Integrity Program   [email or web address]  [phone]

## 2021-05-03 ENCOUNTER — POSTPARTUM VISIT (OUTPATIENT)
Dept: OBSTETRICS AND GYNECOLOGY | Facility: CLINIC | Age: 36
End: 2021-05-03

## 2021-05-03 VITALS
WEIGHT: 221 LBS | BODY MASS INDEX: 33.49 KG/M2 | DIASTOLIC BLOOD PRESSURE: 70 MMHG | HEIGHT: 68 IN | SYSTOLIC BLOOD PRESSURE: 110 MMHG

## 2021-05-03 DIAGNOSIS — Z98.891 PREVIOUS CESAREAN SECTION: ICD-10-CM

## 2021-05-03 PROCEDURE — 0503F POSTPARTUM CARE VISIT: CPT | Performed by: OBSTETRICS & GYNECOLOGY

## 2021-05-03 NOTE — PROGRESS NOTES
"Chief Complaint   Patient presents with   • Postpartum Care     6wk       6 Week Postpartum Visit         China Carbajal is a 35 y.o.  s/p , due to chronic HTN/ repeat at 38 weeks on 3/17/21, who presents today for a 6 week postpartum check.      At the time of delivery were you diagnosed with any of the following: None. The incision is healing well    Patient denies postpartum depression.  Patient describes bleeding as absent.  Patient is breast feeding.  Desires contraceptive methods: Condoms and Vasectomy  for contraception.  Patient denies bowel or bladder issues.    Postpartum Depression Screening Questionnaire: 1, 0 treatment indicated.  Baby Name: Jackie  Baby Weight: 7#14oz  Baby Discharged: Discharged with Mom  Delivering Physician: BG    Last Completed Pap Smear       Status Date      PAP SMEAR No completions recorded        Is the patient due for a pap today? Yes.  Performed Today    The additional following portions of the patient's history were reviewed and updated as appropriate: allergies, current medications, past family history, past medical history, past social history, past surgical history and problem list.    Review of Systems   Constitutional: Negative.    HENT: Negative.    Eyes: Negative.    Respiratory: Negative.    Cardiovascular: Negative.    Gastrointestinal: Negative.    Endocrine: Negative.    Genitourinary: Negative.    Musculoskeletal: Negative.    Skin: Negative.    Allergic/Immunologic: Negative.    Neurological: Negative.    Hematological: Negative.    Psychiatric/Behavioral: Negative.      All other systems reviewed and are negative.     I have reviewed and agree with the HPI, ROS, and historical information as entered above. Gloria Dee MD    /70   Ht 172.7 cm (68\")   Wt 100 kg (221 lb)   LMP  (LMP Unknown)   BMI 33.60 kg/m²     Physical Exam  Vitals and nursing note reviewed. Exam conducted with a chaperone present.   Constitutional:       " Appearance: She is well-developed.   HENT:      Head: Normocephalic and atraumatic.   Pulmonary:      Effort: Pulmonary effort is normal.   Abdominal:      General: A surgical scar is present.      Palpations: Abdomen is soft. Abdomen is not rigid.      Comments: Clean, Dry, and Intact.  No erythema.    Genitourinary:     Vagina: No lesions or prolapsed vaginal walls.      Cervix: No lesion or erythema.      Uterus: Enlarged. Not tender and no uterine prolapse.       Adnexa:         Right: No mass, tenderness or fullness.          Left: No mass, tenderness or fullness.     Musculoskeletal:      Cervical back: Normal range of motion.   Neurological:      Mental Status: She is alert and oriented to person, place, and time.   Psychiatric:         Mood and Affect: Mood normal.         Behavior: Behavior normal.             Assessment and Plan    Problem List Items Addressed This Visit        Gravid and     Previous  section    Overview     3/2014 @ 32wks for HELLP syndrome. 3#10oz  3/17/2021  at 38+2 weeks.  Repeat for chronic hypertension           Other Visit Diagnoses     Postpartum follow-up    -  Primary    Relevant Orders    Pap IG, HPV-hr          1. S/p , 6 weeks postpartum.  Doing well.    2. Return to normal physical activity.  No pelvic restrictions.   3. Contraception: contraceptive methods: Condoms and Vasectomy   4. Follow up for Annual.     Gloria Dee MD  2021

## 2022-05-05 ENCOUNTER — OFFICE VISIT (OUTPATIENT)
Dept: OBSTETRICS AND GYNECOLOGY | Facility: CLINIC | Age: 37
End: 2022-05-05

## 2022-05-05 VITALS
WEIGHT: 216.6 LBS | HEIGHT: 68 IN | BODY MASS INDEX: 32.83 KG/M2 | SYSTOLIC BLOOD PRESSURE: 120 MMHG | DIASTOLIC BLOOD PRESSURE: 70 MMHG

## 2022-05-05 DIAGNOSIS — R53.83 FATIGUE, UNSPECIFIED TYPE: ICD-10-CM

## 2022-05-05 DIAGNOSIS — Z01.419 WOMEN'S ANNUAL ROUTINE GYNECOLOGICAL EXAMINATION: Primary | ICD-10-CM

## 2022-05-05 LAB
25(OH)D3+25(OH)D2 SERPL-MCNC: 12.1 NG/ML (ref 30–100)
ALBUMIN SERPL-MCNC: 4.4 G/DL (ref 3.5–5.2)
ALBUMIN/GLOB SERPL: 1.6 G/DL
ALP SERPL-CCNC: 107 U/L (ref 39–117)
ALT SERPL-CCNC: 131 U/L (ref 1–33)
AST SERPL-CCNC: 152 U/L (ref 1–32)
BASOPHILS # BLD AUTO: 0.06 10*3/MM3 (ref 0–0.2)
BASOPHILS NFR BLD AUTO: 0.7 % (ref 0–1.5)
BILIRUB SERPL-MCNC: 0.5 MG/DL (ref 0–1.2)
BUN SERPL-MCNC: 10 MG/DL (ref 6–20)
BUN/CREAT SERPL: 15.2 (ref 7–25)
CALCIUM SERPL-MCNC: 9.9 MG/DL (ref 8.6–10.5)
CHLORIDE SERPL-SCNC: 105 MMOL/L (ref 98–107)
CHOLEST SERPL-MCNC: 170 MG/DL (ref 0–200)
CO2 SERPL-SCNC: 24.8 MMOL/L (ref 22–29)
CREAT SERPL-MCNC: 0.66 MG/DL (ref 0.57–1)
EGFRCR SERPLBLD CKD-EPI 2021: 116 ML/MIN/1.73
EOSINOPHIL # BLD AUTO: 0.16 10*3/MM3 (ref 0–0.4)
EOSINOPHIL NFR BLD AUTO: 1.8 % (ref 0.3–6.2)
ERYTHROCYTE [DISTWIDTH] IN BLOOD BY AUTOMATED COUNT: 12.4 % (ref 12.3–15.4)
GLOBULIN SER CALC-MCNC: 2.7 GM/DL
GLUCOSE SERPL-MCNC: 99 MG/DL (ref 65–99)
HBA1C MFR BLD: 5.6 % (ref 4.8–5.6)
HCT VFR BLD AUTO: 44.2 % (ref 34–46.6)
HDLC SERPL-MCNC: 39 MG/DL (ref 40–60)
HGB BLD-MCNC: 14.7 G/DL (ref 12–15.9)
IMM GRANULOCYTES # BLD AUTO: 0.01 10*3/MM3 (ref 0–0.05)
IMM GRANULOCYTES NFR BLD AUTO: 0.1 % (ref 0–0.5)
LDLC SERPL CALC-MCNC: 93 MG/DL (ref 0–100)
LYMPHOCYTES # BLD AUTO: 3.46 10*3/MM3 (ref 0.7–3.1)
LYMPHOCYTES NFR BLD AUTO: 39.6 % (ref 19.6–45.3)
MCH RBC QN AUTO: 30.8 PG (ref 26.6–33)
MCHC RBC AUTO-ENTMCNC: 33.3 G/DL (ref 31.5–35.7)
MCV RBC AUTO: 92.7 FL (ref 79–97)
MONOCYTES # BLD AUTO: 0.56 10*3/MM3 (ref 0.1–0.9)
MONOCYTES NFR BLD AUTO: 6.4 % (ref 5–12)
NEUTROPHILS # BLD AUTO: 4.49 10*3/MM3 (ref 1.7–7)
NEUTROPHILS NFR BLD AUTO: 51.4 % (ref 42.7–76)
NRBC BLD AUTO-RTO: 0 /100 WBC (ref 0–0.2)
PLATELET # BLD AUTO: 258 10*3/MM3 (ref 140–450)
POTASSIUM SERPL-SCNC: 4.2 MMOL/L (ref 3.5–5.2)
PROT SERPL-MCNC: 7.1 G/DL (ref 6–8.5)
RBC # BLD AUTO: 4.77 10*6/MM3 (ref 3.77–5.28)
SODIUM SERPL-SCNC: 142 MMOL/L (ref 136–145)
TRIGL SERPL-MCNC: 226 MG/DL (ref 0–150)
TSH SERPL DL<=0.005 MIU/L-ACNC: 1.12 UIU/ML (ref 0.27–4.2)
VLDLC SERPL CALC-MCNC: 38 MG/DL (ref 5–40)
WBC # BLD AUTO: 8.74 10*3/MM3 (ref 3.4–10.8)

## 2022-05-05 PROCEDURE — 99395 PREV VISIT EST AGE 18-39: CPT

## 2022-05-05 NOTE — PROGRESS NOTES
GYN Annual Exam     CC - Here for annual exam.        HPI  China Carbajal is a 36 y.o. female, , who presents for annual well woman exam. Patient's last menstrual period was 2022..  Periods are regular every 25-35 days, lasting 3 days. .  Dysmenorrhea:moderate, occurring first 1-2 days of flow.  Patient reports problems with: mild fatigue. There were no changes to her medical or surgical history since her last visit.. Partner Status: Marital Status: .  She is sexually active. She has not had new partners since her last STD testing. She does not desire STD testing. .    Additional OB/GYN History   Current contraception: contraceptive methods: Vasectomy   Desires to: do not start contraception  Last Pap : 5/3/2021. Result: Negative HPV testing   Last Completed Pap Smear          Ordered - PAP SMEAR (Every 3 Years) Ordered on 2021  SCANNED - PAP SMEAR              History of abnormal Pap smear: no  Family history of uterine, colon, breast, or ovarian cancer: no  Performs monthly Self-Breast Exam: yes  Exercises Regularly:no  Feelings of Anxiety or Depression: no  Tobacco Usage?: No   OB History        2    Para   2    Term   1       1    AB   0    Living   2       SAB   0    IAB   0    Ectopic   0    Molar   0    Multiple   0    Live Births   2                Health Maintenance   Topic Date Due   • ANNUAL PHYSICAL  Never done   • COVID-19 Vaccine (1) Never done   • Annual Gynecologic Pelvic and Breast Exam  2022   • INFLUENZA VACCINE  2022   • PAP SMEAR  2024   • TDAP/TD VACCINES (2 - Td or Tdap) 2031   • HEPATITIS C SCREENING  Completed   • Pneumococcal Vaccine 0-64  Aged Out       The additional following portions of the patient's history were reviewed and updated as appropriate: allergies, current medications, past family history, past medical history, past social history, past surgical history and problem list.    Review of Systems  "  Constitutional: Positive for fatigue.   Respiratory: Negative.    Cardiovascular: Negative.    Gastrointestinal: Negative.    Endocrine: Negative for cold intolerance.   Genitourinary: Negative.    Skin: Negative.      I have reviewed and agree with the HPI, ROS, and historical information as entered above. Shefali Henson, APRN    Objective   /70   Ht 172.7 cm (68\")   Wt 98.2 kg (216 lb 9.6 oz)   LMP 04/16/2022   BMI 32.93 kg/m²     Physical Exam  Vitals and nursing note reviewed. Exam conducted with a chaperone present.   Constitutional:       Appearance: Normal appearance. She is well-developed.   HENT:      Head: Normocephalic and atraumatic.   Neck:      Thyroid: No thyroid mass or thyromegaly.   Cardiovascular:      Rate and Rhythm: Normal rate and regular rhythm.      Heart sounds: No murmur heard.  Pulmonary:      Effort: Pulmonary effort is normal. No retractions.      Breath sounds: Normal breath sounds. No wheezing, rhonchi or rales.   Chest:      Chest wall: No mass or tenderness.   Breasts:      Right: Normal. No mass, nipple discharge, skin change or tenderness.      Left: Normal. No mass, nipple discharge, skin change or tenderness.       Abdominal:      Palpations: Abdomen is soft. Abdomen is not rigid. There is no mass.      Tenderness: There is no abdominal tenderness. There is no guarding.      Hernia: No hernia is present. There is no hernia in the left inguinal area.   Genitourinary:     General: Normal vulva.      Labia:         Right: No rash, tenderness or lesion.         Left: No rash, tenderness or lesion.       Vagina: Normal. No vaginal discharge or lesions.      Cervix: No cervical motion tenderness, discharge, lesion or cervical bleeding.      Uterus: Normal. Not enlarged, not fixed and not tender.       Adnexa:         Right: No mass or tenderness.          Left: No mass or tenderness.        Rectum: No external hemorrhoid.   Musculoskeletal:      Cervical back: Normal " range of motion. No muscular tenderness.   Neurological:      Mental Status: She is alert and oriented to person, place, and time.   Psychiatric:         Mood and Affect: Mood normal.         Behavior: Behavior normal.            Assessment and Plan    Problem List Items Addressed This Visit    None     Visit Diagnoses     Women's annual routine gynecological examination    -  Primary    Relevant Orders    Comprehensive Metabolic Panel    Hemoglobin A1c    TSH    Vitamin D 25 Hydroxy    CBC & Differential    Fatigue, unspecified type        Relevant Orders    TSH    Vitamin D 25 Hydroxy          1. GYN annual well woman exam.   2. Reviewed monthly self breast exams.  Instructed to call with lumps, pain, or breast discharge.    3. Reviewed exercise as a preventative health measures.   4. Annual labs ordered. Pt not fasting not lipid panel today.   5. Return in about 1 year (around 5/5/2023) for Annual physical JNA.      Shefali Henson, APRN  05/05/2022

## 2022-05-12 ENCOUNTER — TELEPHONE (OUTPATIENT)
Dept: OBSTETRICS AND GYNECOLOGY | Facility: CLINIC | Age: 37
End: 2022-05-12

## 2022-08-11 ENCOUNTER — OFFICE VISIT (OUTPATIENT)
Dept: CARDIOLOGY | Facility: CLINIC | Age: 37
End: 2022-08-11

## 2022-08-11 VITALS
HEART RATE: 77 BPM | WEIGHT: 209.8 LBS | BODY MASS INDEX: 31.8 KG/M2 | HEIGHT: 68 IN | SYSTOLIC BLOOD PRESSURE: 117 MMHG | OXYGEN SATURATION: 99 % | DIASTOLIC BLOOD PRESSURE: 82 MMHG

## 2022-08-11 DIAGNOSIS — R00.2 PALPITATIONS: Primary | ICD-10-CM

## 2022-08-11 DIAGNOSIS — I10 ESSENTIAL HYPERTENSION: ICD-10-CM

## 2022-08-11 PROCEDURE — 99213 OFFICE O/P EST LOW 20 MIN: CPT | Performed by: NURSE PRACTITIONER

## 2022-08-11 PROCEDURE — 93000 ELECTROCARDIOGRAM COMPLETE: CPT | Performed by: NURSE PRACTITIONER

## 2022-08-11 RX ORDER — ERGOCALCIFEROL 1.25 MG/1
50000 CAPSULE ORAL WEEKLY
COMMUNITY

## 2022-08-11 RX ORDER — LABETALOL 100 MG/1
100 TABLET, FILM COATED ORAL DAILY
Qty: 90 TABLET | Refills: 3 | Status: SHIPPED | OUTPATIENT
Start: 2022-08-11

## 2022-08-11 NOTE — PROGRESS NOTES
Subjective     China Carbajal is a 36 y.o. female who presents to day for follow up / refills and Hypertension.    CHIEF COMPLIANT  Chief Complaint   Patient presents with   • follow up / refills   • Hypertension       Active Problems:  Problem List Items Addressed This Visit        Cardiac and Vasculature    Palpitations - Primary    Relevant Orders    ECG 12 Lead      Other Visit Diagnoses     Essential hypertension        Relevant Medications    labetalol (NORMODYNE) 100 MG tablet    Other Relevant Orders    ECG 12 Lead      Problem List :  1.palpitations  2.primary hypertension      HPI  HPI  Ms. Carbajal is a 36-year-old female patient who is being followed up today for palpitations and chronic arterial hypertension.  Patient does report some palpitations that occur intermittently that can be triggered by stress.  She says it usually only occurs if she misses a dose of her medication.  Overall she feels like she is done well from the standpoint.  She does have chronic arterial hypertension which she is on labetalol 100 mg daily which seems to be controlling her blood pressure quite well.  Today her blood pressure is 117/82 heart rate is 77.  She will continue to monitor blood pressure on a routine basis report any significant highs or lows.  She denies any chest pain, shortness of breath, lower extremity edema, fatigue, syncope, or strokelike symptoms.              PRIOR MEDS  Current Outpatient Medications on File Prior to Visit   Medication Sig Dispense Refill   • Ascorbic Acid (VITAMIN C ADULT GUMMIES PO) Take 1 tablet by mouth Daily.     • Semaglutide (OZEMPIC, 0.25 OR 0.5 MG/DOSE, SC) Inject 0.5 mg under the skin into the appropriate area as directed.     • vitamin D (ERGOCALCIFEROL) 1.25 MG (18456 UT) capsule capsule Take 50,000 Units by mouth 1 (One) Time Per Week.     • [DISCONTINUED] labetalol (NORMODYNE) 100 MG tablet Take 2 tablets by mouth 2 (Two) Times a Day. (Patient taking differently: Take 100 mg by  "mouth Daily. 1 tab po qd) 60 tablet 9   • [DISCONTINUED] Linoleic Acid-Sunflower Oil 500-1000 MG capsule Take  by mouth.       No current facility-administered medications on file prior to visit.       ALLERGIES  Procaine, Bactrim [sulfamethoxazole-trimethoprim], and Latex    HISTORY  Past Medical History:   Diagnosis Date   • Asthma    • Fatty liver 2018   • HELLP syndrome    • Hypertension 2014    HELLP    • Hypertension 2019    [facility]       Social History     Socioeconomic History   • Marital status:    Tobacco Use   • Smoking status: Never Smoker   • Smokeless tobacco: Never Used   Vaping Use   • Vaping Use: Never used   Substance and Sexual Activity   • Alcohol use: No   • Drug use: No   • Sexual activity: Defer       Family History   Problem Relation Age of Onset   • Hypertension Mother    • Cancer Father    • Supraventricular tachycardia Father        Review of Systems   Constitutional: Negative for chills, fatigue and fever.   HENT: Negative for congestion, rhinorrhea and sore throat.    Respiratory: Negative for chest tightness and shortness of breath.    Cardiovascular: Positive for palpitations (had some  but had missed some medication). Negative for chest pain and leg swelling.   Gastrointestinal: Positive for diarrhea (once a month ) and vomiting. Negative for constipation and nausea.   Musculoskeletal: Negative for arthralgias, back pain and neck pain.   Allergic/Immunologic: Negative for environmental allergies and food allergies.   Neurological: Negative for dizziness, syncope, weakness and light-headedness.   Hematological: Does not bruise/bleed easily.   Psychiatric/Behavioral: Negative for sleep disturbance.       Objective     VITALS: /82 (BP Location: Left arm, Patient Position: Sitting)   Pulse 77   Ht 172.7 cm (67.99\")   Wt 95.2 kg (209 lb 12.8 oz)   SpO2 99%   BMI 31.91 kg/m²     LABS:   Lab Results (most recent)     None          IMAGING:   No Images in the past 120 days " found..    EXAM:  Physical Exam  Vitals and nursing note reviewed.   Constitutional:       Appearance: She is well-developed.   HENT:      Head: Normocephalic.   Eyes:      Pupils: Pupils are equal, round, and reactive to light.   Neck:      Thyroid: No thyroid mass.      Vascular: No carotid bruit or JVD.      Trachea: Trachea and phonation normal.   Cardiovascular:      Rate and Rhythm: Normal rate and regular rhythm.      Pulses:           Radial pulses are 2+ on the right side and 2+ on the left side.        Posterior tibial pulses are 2+ on the right side and 2+ on the left side.      Heart sounds: Normal heart sounds. No murmur heard.    No friction rub. No gallop.   Pulmonary:      Effort: Pulmonary effort is normal. No respiratory distress.      Breath sounds: Normal breath sounds. No wheezing or rales.   Abdominal:      General: Bowel sounds are normal.      Palpations: Abdomen is soft.   Musculoskeletal:         General: No swelling. Normal range of motion.      Cervical back: Neck supple.   Skin:     General: Skin is warm and dry.      Capillary Refill: Capillary refill takes less than 2 seconds.      Findings: No rash.   Neurological:      Mental Status: She is alert and oriented to person, place, and time.   Psychiatric:         Speech: Speech normal.         Behavior: Behavior normal.         Thought Content: Thought content normal.         Judgment: Judgment normal.         Procedure     ECG 12 Lead    Date/Time: 8/11/2022 1:55 PM  Performed by: Weston Hercules APRN  Authorized by: Weston Hercules APRN   Comparison: compared with previous ECG from 11/19/2019  Similar to previous ECG  Rhythm: sinus rhythm  Rate: normal  BPM: 68  QRS axis: normal  Other findings: non-specific ST-T wave changes  Comments: Qtc 386 ms  No acute changes                 Assessment & Plan    Diagnosis Plan   1. Palpitations  ECG 12 Lead   2. Essential hypertension  labetalol (NORMODYNE) 100 MG tablet    ECG 12 Lead   1.   Patient's palpitations seem to be well controlled on her current medication regimen.  She only experiences palpitations if she misses doses of her medications or get overly stressed.  We will continue to follow.  2.  Patient's blood pressure is controlled on current blood pressure medication regimen.  No medication changes are warranted at this time.  Patient advised to monitor blood pressure on a daily basis and report any persistent highs or lows.  Set goal blood pressure for patient at 130/80 or below.  3.  Informed of signs and symptoms of ACS and advised to seek emergent treatment for any new worsening symptoms.  Patient also advised sooner follow-up as needed.  Also advised to follow-up with family doctor as needed  This note is dictated utilizing voice recognition software.  Although this record has been proof read, transcriptional errors may still be present. If questions occur regarding the content of this record please do not hesitate to call our office.  I have reviewed and confirmed the accuracy of the ROS as documented by the MA/LPN/RN OMKAR Dunlap    Return in about 1 year (around 8/11/2023), or if symptoms worsen or fail to improve.    Diagnoses and all orders for this visit:    1. Palpitations (Primary)  -     ECG 12 Lead    2. Essential hypertension  -     labetalol (NORMODYNE) 100 MG tablet; Take 1 tablet by mouth Daily. 1 tab po qd  Dispense: 90 tablet; Refill: 3  -     ECG 12 Lead        China Carbajal  reports that she has never smoked. She has never used smokeless tobacco.. I have educated her on the risk of diseases from using tobacco products .       MEDS ORDERED DURING VISIT:  New Medications Ordered This Visit   Medications   • labetalol (NORMODYNE) 100 MG tablet     Sig: Take 1 tablet by mouth Daily. 1 tab po qd     Dispense:  90 tablet     Refill:  3           This document has been electronically signed by OMKAR Dunlap Jr.  August 11, 2022 13:58 EDT

## 2023-05-12 ENCOUNTER — OFFICE VISIT (OUTPATIENT)
Dept: OBSTETRICS AND GYNECOLOGY | Facility: CLINIC | Age: 38
End: 2023-05-12
Payer: COMMERCIAL

## 2023-05-12 VITALS — DIASTOLIC BLOOD PRESSURE: 78 MMHG | WEIGHT: 215.4 LBS | SYSTOLIC BLOOD PRESSURE: 114 MMHG | BODY MASS INDEX: 32.76 KG/M2

## 2023-05-12 DIAGNOSIS — R53.83 FATIGUE, UNSPECIFIED TYPE: ICD-10-CM

## 2023-05-12 DIAGNOSIS — N63.22 BREAST LUMP ON LEFT SIDE AT 11 O'CLOCK POSITION: ICD-10-CM

## 2023-05-12 DIAGNOSIS — Z01.419 WOMEN'S ANNUAL ROUTINE GYNECOLOGICAL EXAMINATION: Primary | ICD-10-CM

## 2023-05-12 NOTE — PROGRESS NOTES
Gynecologic Annual Exam Note        Gynecologic Exam        Subjective     HPI  China Carbajal is a 37 y.o.  female who presents for annual well woman exam as a established patient. There were no changes to her medical or surgical history since her last visit. Patient reports problems with: extreme sickness usually a week before menses, nausea, vomiting, diarrhea. She had gallbladder testing, changed her diet, and started taking digestive enzymes. She states since implementing these interventions 3 months ago her premenstrual symptoms resolved. Patient's last menstrual period was 2023 (exact date). Her periods occur every 25-35 days , lasting 3 days. The flow is moderate. She reports dysmenorrhea is mild, occurring first 1-2 days of flow. Partner Status: Marital Status: .  She is sexually active. She has not had new partners. STD testing recommendations have been explained to the patient and she does not desire STD testing.    Additional OB/GYN History   Current contraception: contraceptive methods: Vasectomy   Desires to: do not start contraception  Thromboembolic Disease: none    History of STD: no    Last Pap : 5/3/21. Results: negative. HPV: negative.   Last Completed Pap Smear          PAP SMEAR (Every 3 Years) Next due on 5/3/2024    2021  SCANNED - PAP SMEAR                 History of abnormal Pap smear: no  Gardasil status:missed  Family history of uterine, colon, breast, or ovarian cancer: yes - cousin with colon ca  Performs monthly Self-Breast Exam: yes  Exercises Regularly:no  Feelings of Anxiety or Depression: no  Tobacco Usage?: No       Current Outpatient Medications:   •  labetalol (NORMODYNE) 100 MG tablet, Take 1 tablet by mouth Daily. 1 tab po qd, Disp: 90 tablet, Rfl: 3  •  Probiotic Product (PROBIOTIC-10 PO), Take  by mouth., Disp: , Rfl:   •  Semaglutide (OZEMPIC, 0.25 OR 0.5 MG/DOSE, SC), Inject 0.5 mg under the skin into the appropriate area as directed.  (Patient not taking: Reported on 2023), Disp: , Rfl:   •  vitamin D (ERGOCALCIFEROL) 1.25 MG (83721 UT) capsule capsule, Take 50,000 Units by mouth 1 (One) Time Per Week. (Patient not taking: Reported on 2023), Disp: , Rfl:      Patient denies the need for medication refills today.    OB History        2    Para   2    Term   1       1    AB   0    Living   2       SAB   0    IAB   0    Ectopic   0    Molar   0    Multiple   0    Live Births   2                Health Maintenance   Topic Date Due   • COVID-19 Vaccine (1) Never done   • ANNUAL PHYSICAL  Never done   • Annual Gynecologic Pelvic and Breast Exam  2023   • INFLUENZA VACCINE  2023   • PAP SMEAR  2024   • TDAP/TD VACCINES (2 - Td or Tdap) 2031   • HEPATITIS C SCREENING  Completed   • Pneumococcal Vaccine 0-64  Aged Out       Past Medical History:   Diagnosis Date   • Asthma    • Fatty liver    • HELLP syndrome    • Hypertension     HELLP    • Hypertension 2019    [facility]        Past Surgical History:   Procedure Laterality Date   •  SECTION     •  SECTION N/A 3/17/2021    Procedure:  SECTION REPEAT;  Surgeon: Gloria Dee MD;  Location: [facility];  Service: Obstetrics/Gynecology;  Laterality: N/A;       The additional following portions of the patient's history were reviewed and updated as appropriate: allergies, current medications, past family history, past medical history, past social history and past surgical history.    Review of Systems   Respiratory: Negative.  Negative for shortness of breath.    Cardiovascular: Negative.  Negative for chest pain.   Gastrointestinal: Negative for abdominal distention, abdominal pain and constipation.   Genitourinary: Negative for breast discharge, breast lump, breast pain, dyspareunia, dysuria, menstrual problem, pelvic pain, pelvic pressure, urinary incontinence, vaginal bleeding, vaginal discharge and vaginal pain.    Psychiatric/Behavioral: Negative.  Negative for depressed mood. The patient is not nervous/anxious.        I have reviewed and agree with the HPI, ROS, and historical information as entered above. Shefali Henson, APRN        Objective   /78   Wt 97.7 kg (215 lb 6.4 oz)   LMP 04/22/2023 (Exact Date)   Breastfeeding No   BMI 32.76 kg/m²     Physical Exam  Vitals and nursing note reviewed. Exam conducted with a chaperone present.   Constitutional:       Appearance: Normal appearance. She is well-developed and normal weight. She is not ill-appearing.   HENT:      Head: Normocephalic and atraumatic.   Neck:      Thyroid: No thyroid mass, thyromegaly or thyroid tenderness.   Cardiovascular:      Rate and Rhythm: Normal rate and regular rhythm.      Heart sounds: Normal heart sounds. No murmur heard.  Pulmonary:      Effort: Pulmonary effort is normal. No retractions.      Breath sounds: Normal breath sounds. No wheezing, rhonchi or rales.   Chest:      Chest wall: No mass or tenderness.   Breasts:     Breasts are symmetrical.      Right: Normal. No swelling, bleeding, inverted nipple, mass, nipple discharge, skin change or tenderness.      Left: Mass present. No swelling, bleeding, inverted nipple, nipple discharge, skin change or tenderness.       Abdominal:      Palpations: Abdomen is soft. Abdomen is not rigid. There is no mass.      Tenderness: There is no abdominal tenderness. There is no guarding or rebound.      Hernia: No hernia is present. There is no hernia in the left inguinal area or right inguinal area.   Genitourinary:     General: Normal vulva.      Labia:         Right: No rash, tenderness or lesion.         Left: No rash, tenderness or lesion.       Vagina: Normal. No vaginal discharge, erythema, tenderness, bleeding or lesions.      Cervix: No cervical motion tenderness, discharge, friability, lesion, erythema or cervical bleeding.      Uterus: Normal. Not enlarged, not fixed and not  tender.       Adnexa: Right adnexa normal and left adnexa normal.        Right: No mass or tenderness.          Left: No mass or tenderness.        Rectum: No external hemorrhoid.   Musculoskeletal:      Cervical back: Normal range of motion. No muscular tenderness.   Lymphadenopathy:      Upper Body:      Right upper body: No supraclavicular, axillary or pectoral adenopathy.      Left upper body: No supraclavicular or axillary adenopathy.   Neurological:      Mental Status: She is alert and oriented to person, place, and time.   Psychiatric:         Mood and Affect: Mood normal.         Behavior: Behavior normal.          Assessment and Plan    Problem List Items Addressed This Visit        Genitourinary and Reproductive     Breast lump on left side at 11 o'clock position    Relevant Orders    Mammo Screening Digital Tomosynthesis Bilateral With CAD    Mammo Diagnostic Digital Tomosynthesis Bilateral With CAD       Symptoms and Signs    Fatigue    Relevant Orders    Vitamin D 25 Hydroxy    TSH Rfx On Abnormal To Free T4   Other Visit Diagnoses     Women's annual routine gynecological examination    -  Primary    Relevant Orders    CBC & Differential    Comprehensive Metabolic Panel    Vitamin D 25 Hydroxy    TSH Rfx On Abnormal To Free T4    Hemoglobin A1c          1. GYN annual well woman exam.   2. Reviewed pap guidelines. No pap today  3. Reviewed monthly self breast exams.  Instructed to call with lumps, pain, or breast discharge.    4. Reviewed BMI and weight loss as preventative health measures.    5. Labs ordered. Not fasting. No lipid panel.    Plan:  1. Proceed with Dx Bilateral Mammogram.  2. Continue interventions to improve premenstrual N/V/D.  3. Will notify patient regarding lab results.   4. Pap 2024  5. Return in about 1 year (around 5/12/2024) for Annual physical or sooner if needed.      Shefali Henson, APRN  05/12/2023

## 2023-05-13 LAB
25(OH)D3+25(OH)D2 SERPL-MCNC: 10.4 NG/ML (ref 30–100)
ALBUMIN SERPL-MCNC: 4.3 G/DL (ref 3.5–5.2)
ALBUMIN/GLOB SERPL: 1.6 G/DL
ALP SERPL-CCNC: 97 U/L (ref 39–117)
ALT SERPL-CCNC: 109 U/L (ref 1–33)
AST SERPL-CCNC: 72 U/L (ref 1–32)
BASOPHILS # BLD AUTO: 0.05 10*3/MM3 (ref 0–0.2)
BASOPHILS NFR BLD AUTO: 0.6 % (ref 0–1.5)
BILIRUB SERPL-MCNC: 0.4 MG/DL (ref 0–1.2)
BUN SERPL-MCNC: 11 MG/DL (ref 6–20)
BUN/CREAT SERPL: 15.7 (ref 7–25)
CALCIUM SERPL-MCNC: 9.9 MG/DL (ref 8.6–10.5)
CHLORIDE SERPL-SCNC: 103 MMOL/L (ref 98–107)
CO2 SERPL-SCNC: 29 MMOL/L (ref 22–29)
CREAT SERPL-MCNC: 0.7 MG/DL (ref 0.57–1)
EGFRCR SERPLBLD CKD-EPI 2021: 113.7 ML/MIN/1.73
EOSINOPHIL # BLD AUTO: 0.16 10*3/MM3 (ref 0–0.4)
EOSINOPHIL NFR BLD AUTO: 1.9 % (ref 0.3–6.2)
ERYTHROCYTE [DISTWIDTH] IN BLOOD BY AUTOMATED COUNT: 11.9 % (ref 12.3–15.4)
GLOBULIN SER CALC-MCNC: 2.7 GM/DL
GLUCOSE SERPL-MCNC: 81 MG/DL (ref 65–99)
HBA1C MFR BLD: 5.6 % (ref 4.8–5.6)
HCT VFR BLD AUTO: 38.3 % (ref 34–46.6)
HGB BLD-MCNC: 13.2 G/DL (ref 12–15.9)
IMM GRANULOCYTES # BLD AUTO: 0.02 10*3/MM3 (ref 0–0.05)
IMM GRANULOCYTES NFR BLD AUTO: 0.2 % (ref 0–0.5)
LYMPHOCYTES # BLD AUTO: 3.84 10*3/MM3 (ref 0.7–3.1)
LYMPHOCYTES NFR BLD AUTO: 46.6 % (ref 19.6–45.3)
MCH RBC QN AUTO: 30.1 PG (ref 26.6–33)
MCHC RBC AUTO-ENTMCNC: 34.5 G/DL (ref 31.5–35.7)
MCV RBC AUTO: 87.2 FL (ref 79–97)
MONOCYTES # BLD AUTO: 0.68 10*3/MM3 (ref 0.1–0.9)
MONOCYTES NFR BLD AUTO: 8.3 % (ref 5–12)
NEUTROPHILS # BLD AUTO: 3.49 10*3/MM3 (ref 1.7–7)
NEUTROPHILS NFR BLD AUTO: 42.4 % (ref 42.7–76)
PLATELET # BLD AUTO: 241 10*3/MM3 (ref 140–450)
POTASSIUM SERPL-SCNC: 4.3 MMOL/L (ref 3.5–5.2)
PROT SERPL-MCNC: 7 G/DL (ref 6–8.5)
RBC # BLD AUTO: 4.39 10*6/MM3 (ref 3.77–5.28)
SODIUM SERPL-SCNC: 140 MMOL/L (ref 136–145)
TSH SERPL DL<=0.005 MIU/L-ACNC: 2.1 UIU/ML (ref 0.27–4.2)
WBC # BLD AUTO: 8.24 10*3/MM3 (ref 3.4–10.8)

## 2023-05-15 ENCOUNTER — HOSPITAL ENCOUNTER (OUTPATIENT)
Dept: ULTRASOUND IMAGING | Facility: HOSPITAL | Age: 38
Discharge: HOME | End: 2023-05-15
Payer: COMMERCIAL

## 2023-05-15 ENCOUNTER — TRANSCRIBE ORDERS (OUTPATIENT)
Dept: MAMMOGRAPHY | Facility: HOSPITAL | Age: 38
End: 2023-05-15
Payer: COMMERCIAL

## 2023-05-15 ENCOUNTER — HOSPITAL ENCOUNTER (OUTPATIENT)
Dept: MAMMOGRAPHY | Facility: HOSPITAL | Age: 38
Discharge: HOME | End: 2023-05-15
Payer: COMMERCIAL

## 2023-05-15 DIAGNOSIS — N63.22 BREAST LUMP ON LEFT SIDE AT 11 O'CLOCK POSITION: ICD-10-CM

## 2023-05-15 DIAGNOSIS — R92.8 ABNORMAL MAMMOGRAM: Primary | ICD-10-CM

## 2023-05-15 PROBLEM — N63.12 MASS OF UPPER INNER QUADRANT OF RIGHT BREAST: Status: ACTIVE | Noted: 2023-05-12

## 2023-05-15 PROCEDURE — 77066 DX MAMMO INCL CAD BI: CPT

## 2023-05-15 PROCEDURE — 77066 DX MAMMO INCL CAD BI: CPT | Performed by: RADIOLOGY

## 2023-05-15 PROCEDURE — 76642 ULTRASOUND BREAST LIMITED: CPT | Performed by: RADIOLOGY

## 2023-05-15 PROCEDURE — 76642 ULTRASOUND BREAST LIMITED: CPT

## 2023-05-15 PROCEDURE — G0279 TOMOSYNTHESIS, MAMMO: HCPCS

## 2023-05-15 PROCEDURE — 77062 BREAST TOMOSYNTHESIS BI: CPT | Performed by: RADIOLOGY

## 2023-05-15 NOTE — PROGRESS NOTES
Please notify patient regarding lab results. Elevated liver enzymes and low vit d. Please advise her to f/u with PCP for further testing and management. TY

## 2023-08-14 ENCOUNTER — TELEPHONE (OUTPATIENT)
Dept: CARDIOLOGY | Facility: CLINIC | Age: 38
End: 2023-08-14
Payer: COMMERCIAL

## 2023-08-14 NOTE — TELEPHONE ENCOUNTER
For the HUB to read to pt:       [facility] TO CONFIRM APPT, IF PT CALLS BACK PLEASE PUT CALL THROUGH, THANK YOU.

## 2023-08-15 ENCOUNTER — OFFICE VISIT (OUTPATIENT)
Dept: CARDIOLOGY | Facility: CLINIC | Age: 38
End: 2023-08-15
Payer: COMMERCIAL

## 2023-08-15 VITALS
SYSTOLIC BLOOD PRESSURE: 133 MMHG | DIASTOLIC BLOOD PRESSURE: 84 MMHG | HEIGHT: 68 IN | HEART RATE: 61 BPM | OXYGEN SATURATION: 97 % | WEIGHT: 221.2 LBS | BODY MASS INDEX: 33.52 KG/M2

## 2023-08-15 DIAGNOSIS — I10 ESSENTIAL HYPERTENSION: Primary | ICD-10-CM

## 2023-08-15 DIAGNOSIS — R00.2 PALPITATIONS: ICD-10-CM

## 2023-08-15 PROCEDURE — 99213 OFFICE O/P EST LOW 20 MIN: CPT | Performed by: NURSE PRACTITIONER

## 2023-08-15 PROCEDURE — 93000 ELECTROCARDIOGRAM COMPLETE: CPT | Performed by: NURSE PRACTITIONER

## 2023-08-15 RX ORDER — LABETALOL 100 MG/1
100 TABLET, FILM COATED ORAL DAILY
Qty: 90 TABLET | Refills: 3 | Status: SHIPPED | OUTPATIENT
Start: 2023-08-15

## 2023-08-15 NOTE — PROGRESS NOTES
Subjective     China Carbajal is a 37 y.o. female who presents to day for yearly follow up , Palpitations, and Hypertension.    CHIEF COMPLIANT  Chief Complaint   Patient presents with    yearly follow up     Palpitations    Hypertension       Active Problems:  Problem List Items Addressed This Visit          Cardiac and Vasculature    Palpitations    Relevant Orders    ECG 12 Lead     Other Visit Diagnoses       Essential hypertension    -  Primary    Relevant Medications    labetalol (NORMODYNE) 100 MG tablet    Other Relevant Orders    ECG 12 Lead        Problem List :  1.palpitations  2.primary hypertension    HPI  HPI    China Carbajal is a 37-year-old female patient who is being followed up today for palpitations and chronic arterial hypertension. Patient is on labetalol for hypertension and palpitations. Today, her blood pressure is well controlled at 133/84 mmHg, heart rate is 61 beats per minute.    The patient states she is doing well. She reports her palpitations have improved. She states a few weeks ago, she experienced an episode at work, but she listened to calming music and it helped.    She reports she had a breast biopsy, and the results were normal.    The patient states she was diagnosed with insulin resistant, and she is taking metformin. She states she stopped taking Ozempic because she was experiencing severe stomach issues. She notes she had a gallbladder scan, and her gallbladder is not functioning at 27 percent. She states she spoke to her surgeon, and he told her she did not need to have it removed. She reports since she changed her eating habits, she does not get sick anymore. She states her doctor wanted her to try the metformin for a while to see if it would help her insulin come down, and after she is on it to get rid of the symptoms.    The patient states she was not sure if it was the Ozempic or if it just happened at that time, but she became debilitated and sick with her stomach. She  states she believes it was linked to her hormones because it would occur once a month right before her menstrual cycle. She notes it lasted for 1 day, and it resolved the next day. She states she has not had it in months.    PRIOR MEDS  Current Outpatient Medications on File Prior to Visit   Medication Sig Dispense Refill    metFORMIN (GLUCOPHAGE) 500 MG tablet Take 1 tablet by mouth Daily.      Probiotic Product (PROBIOTIC-10 PO) Take  by mouth.      vitamin D (ERGOCALCIFEROL) 1.25 MG (82098 UT) capsule capsule Take 1 capsule by mouth 1 (One) Time Per Week.      Semaglutide (OZEMPIC, 0.25 OR 0.5 MG/DOSE, SC) Inject 0.5 mg under the skin into the appropriate area as directed. (Patient not taking: Reported on 5/12/2023)       No current facility-administered medications on file prior to visit.       ALLERGIES  Procaine, Bactrim [sulfamethoxazole-trimethoprim], and Latex    HISTORY  Past Medical History:   Diagnosis Date    Asthma     Fatty liver 2018    HELLP syndrome     Hypertension 2014    HELLP     Hypertension 2019    CHTN       Social History     Socioeconomic History    Marital status:    Tobacco Use    Smoking status: Never    Smokeless tobacco: Never   Vaping Use    Vaping Use: Never used   Substance and Sexual Activity    Alcohol use: No    Drug use: No    Sexual activity: Yes     Birth control/protection: Vasectomy       Family History   Problem Relation Age of Onset    Cancer Father     Supraventricular tachycardia Father     Hypertension Mother     Colon cancer Other     Breast cancer Neg Hx     Ovarian cancer Neg Hx     Uterine cancer Neg Hx        Review of Systems   Constitutional:  Negative for chills, fatigue and fever.   HENT:  Negative for congestion, rhinorrhea and sore throat.    Eyes:  Negative for visual disturbance.   Respiratory:  Negative for apnea, chest tightness and shortness of breath.    Cardiovascular:  Positive for leg swelling (feet swell sometimes). Negative for chest pain  "and palpitations.   Gastrointestinal:  Negative for constipation, diarrhea and nausea.   Musculoskeletal:  Negative for arthralgias, back pain and neck pain.   Allergic/Immunologic: Positive for environmental allergies. Negative for food allergies.   Neurological:  Negative for dizziness, syncope, weakness and light-headedness.   Hematological:  Does not bruise/bleed easily.   Psychiatric/Behavioral:  Negative for sleep disturbance.      Objective     VITALS: /84 (BP Location: Left arm, Patient Position: Sitting, Cuff Size: Adult)   Pulse 61   Ht 172.7 cm (68\")   Wt 100 kg (221 lb 3.2 oz)   SpO2 97%   BMI 33.63 kg/mý     LABS:   Lab Results (most recent)       None            IMAGING:   US Breast Bilateral Limited    Result Date: 5/15/2023  BI-RADS 4 suspicious abnormality right breast  RECOMMENDATION:  Affirm guided tomographic biopsy of right breast mass using a CC above approach.  The standard false-negative rate of mammography is between 10% and 25%. Complex patterns or increased breast density will markedly elevate the false-negative rate of mammography.    A results letter, in lay terminology, will be given to the patient at the conclusion of the exam.  ________________________________________________________________________ _______ Physicians Order  Stereotactic Breast Biopsy  Diagnosis: Abnormal Mammogram  This report was finalized on 5/15/2023 3:13 PM by Dr. Karlie Mejía MD.      Mammo Diagnostic Digital Tomosynthesis Bilateral With CAD    Result Date: 5/15/2023  BI-RADS 4 suspicious abnormality right breast  RECOMMENDATION:  Affirm guided tomographic biopsy of right breast mass using a CC above approach.  The standard false-negative rate of mammography is between 10% and 25%. Complex patterns or increased breast density will markedly elevate the false-negative rate of mammography.    A results letter, in lay terminology, will be given to the patient at the conclusion of the exam.  " ________________________________________________________________________ _______ Physicians Order  Stereotactic Breast Biopsy  Diagnosis: Abnormal Mammogram  This report was finalized on 5/15/2023 3:13 PM by Dr. Karlie Mejía MD.        EXAM:  Physical Exam  Vitals and nursing note reviewed.   Constitutional:       Appearance: She is well-developed.   HENT:      Head: Normocephalic.   Neck:      Thyroid: No thyroid mass.      Vascular: No carotid bruit or JVD.      Trachea: Trachea and phonation normal.   Cardiovascular:      Rate and Rhythm: Normal rate and regular rhythm.      Pulses:           Radial pulses are 2+ on the right side and 2+ on the left side.        Posterior tibial pulses are 2+ on the right side and 2+ on the left side.      Heart sounds: Normal heart sounds. No murmur heard.    No friction rub. No gallop.   Pulmonary:      Effort: Pulmonary effort is normal. No respiratory distress.      Breath sounds: Normal breath sounds. No wheezing or rales.   Musculoskeletal:         General: No swelling. Normal range of motion.      Cervical back: Neck supple.   Skin:     General: Skin is warm and dry.      Capillary Refill: Capillary refill takes less than 2 seconds.      Findings: No rash.   Neurological:      Mental Status: She is alert and oriented to person, place, and time.   Psychiatric:         Speech: Speech normal.         Behavior: Behavior normal.         Thought Content: Thought content normal.         Judgment: Judgment normal.       Procedure     ECG 12 Lead    Date/Time: 8/15/2023 1:11 PM  Performed by: Weston Hercules APRN  Authorized by: Weston Hercules APRN   Comparison: compared with previous ECG from 8/11/2023  Similar to previous ECG  Rhythm: sinus rhythm  Rate: normal  BPM: 64  QRS axis: normal  Other findings: non-specific ST-T wave changes  Comments: QTc 433 ms  No acute changes           Assessment & Plan    Diagnosis Plan   1. Essential hypertension  ECG 12 Lead    labetalol  (NORMODYNE) 100 MG tablet      2. Palpitations  ECG 12 Lead        1. The patient's most recent cardiac test results were reviewed and discussed in full detail with the patient.  2. The patient's medical regimen was reviewed and discussed in full detail with the patient.  She is tolerating this medication management well and will continue without any changes.  She says she is only had palpitations when she has increased levels of stress especially at work.  Overall she feels the labetalol is controlling her palpitations well.  3.  Patient's blood pressure is controlled on current blood pressure medication regimen.  No medication changes are warranted at this time.  Patient advised to monitor blood pressure on a daily basis and report any persistent highs or lows.  Set goal blood pressure for patient at 130/80 or below.  4.  Informed of signs and symptoms of ACS and advised to seek emergent treatment for any new worsening symptoms.  Patient also advised sooner follow-up as needed.  Also advised to follow-up with family doctor as needed  This note is dictated utilizing voice recognition software.  Although this record has been proof read, transcriptional errors may still be present. If questions occur regarding the content of this record please do not hesitate to call our office.  I have reviewed and confirmed the accuracy of the ROS as documented by the MA/LPN/RN OMKAR Dunlap    Return in about 1 year (around 8/15/2024), or if symptoms worsen or fail to improve.    Diagnoses and all orders for this visit:    1. Essential hypertension (Primary)  -     ECG 12 Lead  -     labetalol (NORMODYNE) 100 MG tablet; Take 1 tablet by mouth Daily. 1 tab po qd  Dispense: 90 tablet; Refill: 3    2. Palpitations  -     ECG 12 Lead      Chinaselena Carbajal  reports that she has never smoked. She has never used smokeless tobacco.. I have educated her on the risk of diseases from using tobacco products .    MEDS ORDERED DURING  VISIT:  New Medications Ordered This Visit   Medications    labetalol (NORMODYNE) 100 MG tablet     Sig: Take 1 tablet by mouth Daily. 1 tab po qd     Dispense:  90 tablet     Refill:  3     Transcribed from ambient dictation for OMKAR Dunlap by Daja Thorne.  08/15/23   15:46 EDT    Patient or patient representative verbalized consent to the visit recording.  I have personally performed the services described in this document as transcribed by the above individual, and it is both accurate and complete.         This document has been electronically signed by OMKAR Dunlap Jr.  August 16, 2023 08:58 EDT

## 2024-05-17 ENCOUNTER — OFFICE VISIT (OUTPATIENT)
Dept: OBSTETRICS AND GYNECOLOGY | Facility: CLINIC | Age: 39
End: 2024-05-17
Payer: COMMERCIAL

## 2024-05-17 VITALS
DIASTOLIC BLOOD PRESSURE: 68 MMHG | SYSTOLIC BLOOD PRESSURE: 122 MMHG | WEIGHT: 223.4 LBS | BODY MASS INDEX: 33.86 KG/M2 | HEIGHT: 68 IN

## 2024-05-17 DIAGNOSIS — Z12.4 SCREENING FOR CERVICAL CANCER: ICD-10-CM

## 2024-05-17 DIAGNOSIS — R19.8 ALTERNATING CONSTIPATION AND DIARRHEA: ICD-10-CM

## 2024-05-17 DIAGNOSIS — Z01.419 WOMEN'S ANNUAL ROUTINE GYNECOLOGICAL EXAMINATION: Primary | ICD-10-CM

## 2024-05-17 DIAGNOSIS — F41.9 ANXIETY: ICD-10-CM

## 2024-05-17 DIAGNOSIS — Z12.31 ENCOUNTER FOR SCREENING MAMMOGRAM FOR MALIGNANT NEOPLASM OF BREAST: ICD-10-CM

## 2024-05-17 NOTE — PROGRESS NOTES
Gynecologic Annual Exam Note        Gynecologic Exam        Subjective     HPI  China Carbajal is a 38 y.o.  female who presents for annual well woman exam as a established patient. Patient reports she had a breast biopsy on 2023. Patient's last menstrual period was 2024 (approximate). Her periods occur every 28 days  becoming irregular , lasting 3-4 days.  The flow is light. Patient reports last 2 periods were heavier than normal. She reports dysmenorrhea is mild occurring first 1-2 days of flow. Marital Status: .  She is sexually active. She has not had new partners.. STD testing recommendations have been explained to the patient and she does not desire STD testing.    The patient would like to discuss the following complaints today: Anxiety that has worsened. A little less patience for things and children.     Additional OB/GYN History   contraceptive methods: Vasectomy   Desires to: do not start contraception  Thromboembolic Disease: none  History of migraines: no      History of STD: no    Last Pap : 2021. Results: negative. HPV: negative.   Last Completed Pap Smear       This patient has no relevant Health Maintenance data.             History of abnormal Pap smear: no  Family history of uterine, colon, breast, or ovarian cancer: no  Performs monthly Self-Breast Exam: no  Exercises Regularly:no  Feelings of Anxiety or Depression: yes - increased Anxiety within the last year  Tobacco Usage?: No       Current Outpatient Medications:     labetalol (NORMODYNE) 100 MG tablet, Take 1 tablet by mouth Daily. 1 tab po qd, Disp: 90 tablet, Rfl: 3    Probiotic Product (PROBIOTIC-10 PO), Take  by mouth., Disp: , Rfl:     sertraline (Zoloft) 50 MG tablet, Take 1 tablet by mouth Daily., Disp: 30 tablet, Rfl: 2    vitamin D (ERGOCALCIFEROL) 1.25 MG (39817 UT) capsule capsule, Take 1 capsule by mouth 1 (One) Time Per Week., Disp: , Rfl:     metFORMIN (GLUCOPHAGE) 500 MG tablet, Take 1 tablet by  mouth Daily. (Patient not taking: Reported on 2024), Disp: , Rfl:     Semaglutide (OZEMPIC, 0.25 OR 0.5 MG/DOSE, SC), Inject 0.5 mg under the skin into the appropriate area as directed. (Patient not taking: Reported on 2023), Disp: , Rfl:      Patient denies the need for medication refills today.    OB History          2    Para   2    Term   1       1    AB   0    Living   2         SAB   0    IAB   0    Ectopic   0    Molar   0    Multiple   0    Live Births   2                Health Maintenance   Topic Date Due    ANNUAL PHYSICAL  Never done    BMI FOLLOWUP  2023    COVID-19 Vaccine (2023- season) Never done    PAP SMEAR  2024    Annual Gynecologic Pelvic and Breast Exam  2024    MAMMOGRAM  05/15/2024    INFLUENZA VACCINE  2024    TDAP/TD VACCINES (2 - Td or Tdap) 2031    HEPATITIS C SCREENING  Completed    Pneumococcal Vaccine 0-64  Aged Out       Past Medical History:   Diagnosis Date    Asthma     Fatty liver     HELLP syndrome     Hypertension 2014    HELLP     Hypertension 2019    CHTN        Past Surgical History:   Procedure Laterality Date    BREAST BIOPSY Right 2023    biopsy was good     SECTION       SECTION N/A 2021    Procedure:  SECTION REPEAT;  Surgeon: Gloria Dee MD;  Location: [facility] LABOR DELIVERY;  Service: Obstetrics/Gynecology;  Laterality: N/A;       The additional following portions of the patient's history were reviewed and updated as appropriate: allergies, current medications, past family history, past medical history, past social history, and past surgical history.    Review of Systems   Respiratory: Negative.  Negative for shortness of breath.    Cardiovascular: Negative.  Negative for chest pain.   Gastrointestinal: Negative.  Positive for constipation and diarrhea. Negative for abdominal distention and abdominal pain.   Genitourinary:  Positive for breast lump. Negative for  "breast discharge, breast pain, dyspareunia, dysuria, menstrual problem, pelvic pain, pelvic pressure, urinary incontinence, vaginal bleeding, vaginal discharge and vaginal pain.   Psychiatric/Behavioral: Negative.  Negative for suicidal ideas and depressed mood. The patient is nervous/anxious.          I have reviewed and agree with the HPI, ROS, and historical information as entered above. Shefali CAMI Henson, APRN          Objective   /68 (BP Location: Right arm, Patient Position: Sitting, Cuff Size: Adult)   Ht 172.7 cm (67.99\")   Wt 101 kg (223 lb 6.4 oz)   LMP 04/22/2024 (Approximate)   BMI 33.98 kg/m²     Physical Exam  Vitals and nursing note reviewed. Exam conducted with a chaperone present.   Constitutional:       General: She is not in acute distress.     Appearance: Normal appearance. She is well-developed. She is not ill-appearing or toxic-appearing.   HENT:      Head: Normocephalic and atraumatic.   Neck:      Thyroid: No thyroid mass, thyromegaly or thyroid tenderness.   Cardiovascular:      Rate and Rhythm: Normal rate and regular rhythm.      Heart sounds: Normal heart sounds. No murmur heard.  Pulmonary:      Effort: Pulmonary effort is normal. No retractions.      Breath sounds: Normal breath sounds. No wheezing, rhonchi or rales.   Chest:      Chest wall: No mass or tenderness.   Breasts:     Breasts are symmetrical.      Right: Normal. No mass, nipple discharge, skin change or tenderness.      Left: Normal. No mass, nipple discharge, skin change or tenderness.   Abdominal:      Palpations: Abdomen is soft. Abdomen is not rigid. There is no mass.      Tenderness: There is no abdominal tenderness. There is no guarding or rebound.      Hernia: No hernia is present. There is no hernia in the left inguinal area or right inguinal area.   Genitourinary:     General: Normal vulva.      Labia:         Right: No rash, tenderness or lesion.         Left: No rash, tenderness or lesion.       Vagina: " Normal. No vaginal discharge, erythema, tenderness, bleeding or lesions.      Cervix: No cervical motion tenderness, discharge, friability, lesion, erythema or cervical bleeding.      Uterus: Normal. Not enlarged, not fixed and not tender.       Adnexa: Right adnexa normal and left adnexa normal.        Right: No mass or tenderness.          Left: No mass or tenderness.        Rectum: No external hemorrhoid.   Musculoskeletal:      Cervical back: Normal range of motion. No muscular tenderness.   Lymphadenopathy:      Upper Body:      Right upper body: No supraclavicular or axillary adenopathy.      Left upper body: No supraclavicular or axillary adenopathy.   Neurological:      Mental Status: She is alert and oriented to person, place, and time.   Psychiatric:         Attention and Perception: Attention normal.         Mood and Affect: Mood normal. Mood is anxious. Mood is not depressed.         Speech: Speech normal.         Behavior: Behavior normal. Behavior is cooperative.         Thought Content: Thought content normal. Thought content does not include homicidal or suicidal ideation. Thought content does not include homicidal or suicidal plan.         Judgment: Judgment normal.            Assessment and Plan    Problem List Items Addressed This Visit       Alternating constipation and diarrhea    Relevant Orders    Ambulatory Referral to Gastroenterology    Anxiety    Overview     5/17/2024-Zoloft 50 mg prescribed.  Declined  referral.  Follow-up in 3 months to reevaluate anxiety.         Relevant Medications    sertraline (Zoloft) 50 MG tablet     Other Visit Diagnoses       Women's annual routine gynecological examination    -  Primary    Relevant Orders    LIQUID-BASED PAP SMEAR WITH HPV GENOTYPING REGARDLESS OF INTERPRETATION ([facility],MAD)    Mammo Diagnostic Digital Tomosynthesis Bilateral With CAD    Encounter for screening mammogram for malignant neoplasm of breast        Relevant Orders    Mammo  Diagnostic Digital Tomosynthesis Bilateral With CAD    Screening for cervical cancer        Relevant Orders    LIQUID-BASED PAP SMEAR WITH HPV GENOTYPING REGARDLESS OF INTERPRETATION ([facility],COR,[facility])            GYN annual well woman exam.   Reviewed pap guidelines. Pap performed.   Reviewed monthly self breast exams.  Instructed to call with lumps, pain, or breast discharge.    Reviewed BMI, exercise, and weight loss as preventative health measures.    Reccommended Flu Vaccine in Fall of each year.  Referred to GI for further evaluation of ,chronic diarrhea/constipation.   Interventions for anxiety reviewed with patient.  Patient declined behavioral health referral.  She desires to begin medication.  Medication options reviewed.  Zoloft prescribed.  Benefits, risks, potential side effects, and onset of action reviewed with patient.  Emphasized the risk for SI/HI.  She denies SI/HI.  Zoloft education included in patient instructions.   Return in about 3 months (around 8/17/2024) for F/u anxiety.    Shefali Henson, APRN  05/17/2024

## 2024-05-19 PROBLEM — R19.8 ALTERNATING CONSTIPATION AND DIARRHEA: Status: ACTIVE | Noted: 2024-05-19

## 2024-05-19 PROBLEM — F41.9 ANXIETY: Status: ACTIVE | Noted: 2024-05-19

## 2024-05-21 LAB — REF LAB TEST METHOD: NORMAL

## 2024-08-19 DIAGNOSIS — F41.9 ANXIETY: ICD-10-CM

## 2024-08-21 ENCOUNTER — HOSPITAL ENCOUNTER (OUTPATIENT)
Facility: HOSPITAL | Age: 39
Discharge: HOME | End: 2024-08-21
Payer: COMMERCIAL

## 2024-08-21 DIAGNOSIS — Z12.31 ENCOUNTER FOR SCREENING MAMMOGRAM FOR MALIGNANT NEOPLASM OF BREAST: ICD-10-CM

## 2024-08-21 DIAGNOSIS — Z01.419 WOMEN'S ANNUAL ROUTINE GYNECOLOGICAL EXAMINATION: ICD-10-CM

## 2024-08-21 PROCEDURE — G0279 TOMOSYNTHESIS, MAMMO: HCPCS

## 2024-08-21 PROCEDURE — 77062 BREAST TOMOSYNTHESIS BI: CPT | Performed by: RADIOLOGY

## 2024-08-21 PROCEDURE — 77066 DX MAMMO INCL CAD BI: CPT

## 2024-08-21 PROCEDURE — 77066 DX MAMMO INCL CAD BI: CPT | Performed by: RADIOLOGY

## 2024-10-02 DIAGNOSIS — I10 ESSENTIAL HYPERTENSION: ICD-10-CM

## 2024-10-03 RX ORDER — LABETALOL 100 MG/1
100 TABLET, FILM COATED ORAL DAILY
Qty: 90 TABLET | Refills: 0 | Status: SHIPPED | OUTPATIENT
Start: 2024-10-03

## 2025-03-13 NOTE — PROGRESS NOTES
Subjective     China Carbajal is a 39 y.o. female who presents to day for yearly follow up  and Hypertension.    CHIEF COMPLIANT  Chief Complaint   Patient presents with    yearly follow up     Hypertension       Active Problems:  Problem List Items Addressed This Visit    None  Visit Diagnoses         Primary hypertension    -  Primary      Mixed hyperlipidemia            Problem List :  1.palpitations  2.primary hypertension  3. Hyperlipidemia    HPI  HPI  China Carbajal is a 39-year-old female patient who is being followed up today for palpitations and chronic arterial hypertension.    Patient does have a history of chronic arterial hypertension in which she is being treated with labetalol.  Today her blood pressure is elevated 149/83 heart rate of 73.  She reports that she does not check her blood pressure at home.    We did review her labs that was provided by her PCP.  She had a normal CBC.  CMP had elevated AST of 73 ALT of 87 otherwise unremarkable.  Hemoglobin A1c was 5.8 and she has recently been diagnosed with insulin resistance.  Her lipid panel showed HDL 47, .    Patient denies any chest pain, shortness of breath, palpitations, lower extremity edema, fatigue, syncope, PND, orthopnea, or strokelike symptoms.  PRIOR MEDS  Current Outpatient Medications on File Prior to Visit   Medication Sig Dispense Refill    ascorbic acid (VITAMIN C) 1000 MG tablet Take 2 tablets by mouth Daily.      Cholecalciferol (VITAMIN D3 PO) Take 50 mcg by mouth Daily.      labetalol (NORMODYNE) 100 MG tablet Take 1 tablet by mouth once daily 90 tablet 0    Probiotic Product (PROBIOTIC-10 PO) Take  by mouth.      vitamin D (ERGOCALCIFEROL) 1.25 MG (53415 UT) capsule capsule Take 1 capsule by mouth 1 (One) Time Per Week.      [DISCONTINUED] sertraline (Zoloft) 50 MG tablet Take 1 tablet by mouth Daily. 90 tablet 2     No current facility-administered medications on file prior to visit.       ALLERGIES  Procaine, Bactrim  "[sulfamethoxazole-trimethoprim], and Latex    HISTORY  Past Medical History:   Diagnosis Date    Asthma     Fatty liver 2018    HELLP syndrome     Hypertension 2014    HELLP     Hypertension 2019    CHTN       Social History     Socioeconomic History    Marital status:    Tobacco Use    Smoking status: Never    Smokeless tobacco: Never   Vaping Use    Vaping status: Never Used   Substance and Sexual Activity    Alcohol use: No    Drug use: No    Sexual activity: Yes     Partners: Male     Birth control/protection: Vasectomy       Family History   Problem Relation Age of Onset    Cancer Father     Supraventricular tachycardia Father     Hypertension Mother     Colon cancer Other     Breast cancer Neg Hx     Ovarian cancer Neg Hx     Uterine cancer Neg Hx        Review of Systems   Constitutional:  Negative for chills, fatigue and fever.   HENT:  Negative for congestion, rhinorrhea and sore throat.    Eyes:  Negative for visual disturbance.   Respiratory:  Negative for apnea, chest tightness and shortness of breath.    Cardiovascular:  Negative for chest pain, palpitations and leg swelling.   Gastrointestinal:  Positive for constipation and diarrhea. Negative for nausea.   Musculoskeletal:  Negative for arthralgias, back pain and neck pain.   Skin:  Negative for rash and wound.   Allergic/Immunologic: Positive for environmental allergies. Negative for food allergies.   Neurological:  Negative for dizziness, syncope, weakness and light-headedness.   Hematological:  Does not bruise/bleed easily.   Psychiatric/Behavioral:  Negative for sleep disturbance.        Objective     VITALS: /83 (BP Location: Right arm, Patient Position: Sitting, Cuff Size: Adult)   Pulse 73   Ht 172.7 cm (67.99\")   Wt 101 kg (223 lb 6.4 oz)   SpO2 98%   BMI 33.98 kg/m²     LABS:   Lab Results (most recent)       None            IMAGING:   No Images in the past 120 days found..    EXAM:  Physical Exam  Vitals and nursing note " reviewed.   Constitutional:       Appearance: She is well-developed.   HENT:      Head: Normocephalic.   Neck:      Thyroid: No thyroid mass.      Vascular: No carotid bruit or JVD.      Trachea: Trachea and phonation normal.   Cardiovascular:      Rate and Rhythm: Normal rate and regular rhythm.      Pulses:           Radial pulses are 2+ on the right side and 2+ on the left side.        Posterior tibial pulses are 2+ on the right side and 2+ on the left side.      Heart sounds: Normal heart sounds. No murmur heard.     No friction rub. No gallop.   Pulmonary:      Effort: Pulmonary effort is normal. No respiratory distress.      Breath sounds: Normal breath sounds. No wheezing or rales.   Musculoskeletal:         General: No swelling. Normal range of motion.      Cervical back: Neck supple.   Skin:     General: Skin is warm and dry.      Capillary Refill: Capillary refill takes less than 2 seconds.      Findings: No rash.   Neurological:      Mental Status: She is alert and oriented to person, place, and time.   Psychiatric:         Speech: Speech normal.         Behavior: Behavior normal.         Thought Content: Thought content normal.         Judgment: Judgment normal.         Procedure   Procedures       Assessment & Plan    Diagnosis Plan   1. Primary hypertension        2. Mixed hyperlipidemia        1.  Due to patient's elevated blood pressure she is in agreements to monitor blood pressure at home for 1 week and return a log for further antihypertensive medication titration at that time.  Goal blood pressure 130 over 80s was discussed.  2.  Patient was found to have an elevated LDL per laboratory work-up.  We had extensive conversation and lifestyle modifications that would help reduce their LDL cholesterol.  We discussed decreasing foods of animal product such as meats cheese and eggs.  And when choosing me chicken or fish are a better alternative than red meats.  We will recheck her lipid panel in 6  months.  3.  Informed of signs and symptoms of ACS and advised to seek emergent treatment for any new worsening symptoms.  Patient also advised sooner follow-up as needed.  Also advised to follow-up with family doctor as needed  This note is dictated utilizing voice recognition software.  Although this record has been proof read, transcriptional errors may still be present. If questions occur regarding the content of this record please do not hesitate to call our office.  I have reviewed and confirmed the accuracy of the ROS as documented by the MA/LPN/RN OMKAR Dunlap    Return in 1 year (on 3/14/2026), or if symptoms worsen or fail to improve.    Diagnoses and all orders for this visit:    1. Primary hypertension (Primary)    2. Mixed hyperlipidemia        China Carbajal  reports that she has never smoked. She has never used smokeless tobacco. I have educated her on the risk of diseases from using tobacco products. Patient does not smoke.        BMI is >= 30 and <35. (Class 1 Obesity). The following options were offered after discussion;: exercise counseling/recommendations and nutrition counseling/recommendations           MEDS ORDERED DURING VISIT:  No orders of the defined types were placed in this encounter.          This document has been electronically signed by OMKAR Dunlap Jr.  March 14, 2025 10:44 EDT

## 2025-03-14 ENCOUNTER — OFFICE VISIT (OUTPATIENT)
Dept: CARDIOLOGY | Facility: CLINIC | Age: 40
End: 2025-03-14
Payer: COMMERCIAL

## 2025-03-14 VITALS
DIASTOLIC BLOOD PRESSURE: 83 MMHG | OXYGEN SATURATION: 98 % | SYSTOLIC BLOOD PRESSURE: 149 MMHG | BODY MASS INDEX: 33.86 KG/M2 | HEIGHT: 68 IN | HEART RATE: 73 BPM | WEIGHT: 223.4 LBS

## 2025-03-14 DIAGNOSIS — E78.2 MIXED HYPERLIPIDEMIA: ICD-10-CM

## 2025-03-14 DIAGNOSIS — I10 PRIMARY HYPERTENSION: Primary | ICD-10-CM

## 2025-03-14 DIAGNOSIS — I10 ESSENTIAL HYPERTENSION: ICD-10-CM

## 2025-03-14 PROCEDURE — 99214 OFFICE O/P EST MOD 30 MIN: CPT | Performed by: NURSE PRACTITIONER

## 2025-03-14 RX ORDER — MULTIVIT WITH MINERALS/LUTEIN
2000 TABLET ORAL DAILY
COMMUNITY

## 2025-03-14 RX ORDER — LABETALOL 100 MG/1
100 TABLET, FILM COATED ORAL DAILY
Qty: 90 TABLET | Refills: 3 | Status: SHIPPED | OUTPATIENT
Start: 2025-03-14

## 2025-04-01 ENCOUNTER — TELEPHONE (OUTPATIENT)
Dept: CARDIOLOGY | Facility: CLINIC | Age: 40
End: 2025-04-01

## 2025-04-01 NOTE — TELEPHONE ENCOUNTER
Pt sent a [employer] message inquiring about JR's recommendations after reviewing her B/P log.  Log was submitted through [employer] for JR to review.

## (undated) DEVICE — SUT GUT CHRM 1 CTX 36IN 905H

## (undated) DEVICE — PK C/SECT 10

## (undated) DEVICE — MAT PREVALON MOBL TRANSFR AIR WO/PAD 39X80IN

## (undated) DEVICE — SKIN AFFIX SURG ADHESIVE 72/CS 0.55ML: Brand: MEDLINE

## (undated) DEVICE — TRY SPINE BLCK WHITACRE 25G 3X5IN

## (undated) DEVICE — SUT VIC 4/0 KS 27IN VCP662H

## (undated) DEVICE — SOL IRR NACL 0.9PCT BT 1000ML

## (undated) DEVICE — SOL IRR H2O BTL 1000ML STRL

## (undated) DEVICE — GLV SURG BIOGEL LTX PF 6 1/2

## (undated) DEVICE — SUT PDS 1 TP1 48IN Z880G BX/12

## (undated) DEVICE — SUT GUT CHRM 2/0 CT1 27IN 811H